# Patient Record
Sex: MALE | Race: WHITE | NOT HISPANIC OR LATINO | Employment: FULL TIME | URBAN - METROPOLITAN AREA
[De-identification: names, ages, dates, MRNs, and addresses within clinical notes are randomized per-mention and may not be internally consistent; named-entity substitution may affect disease eponyms.]

---

## 2017-09-15 DIAGNOSIS — Z23 FLU VACCINE NEED: ICD-10-CM

## 2017-09-21 ENCOUNTER — HOSPITAL ENCOUNTER (OUTPATIENT)
Dept: LAB | Facility: MEDICAL CENTER | Age: 27
End: 2017-09-21
Payer: COMMERCIAL

## 2017-09-21 LAB
BDY FAT % MEASURED: 18.9 %
BP DIAS: 70 MMHG
BP SYS: 124 MMHG
CHOLEST SERPL-MCNC: 131 MG/DL (ref 100–199)
DIABETES HTDIA: NO
EVENT NAME HTEVT: NORMAL
FASTING HTFAS: YES
GLUCOSE SERPL-MCNC: 72 MG/DL (ref 65–99)
HDLC SERPL-MCNC: 39 MG/DL
HYPERTENSION HTHYP: NO
LDLC SERPL CALC-MCNC: 73 MG/DL
SCREENING LOC CITY HTCIT: NORMAL
SCREENING LOC STATE HTSTA: NORMAL
SCREENING LOCATION HTLOC: NORMAL
SMOKING HTSMO: NO
SUBSCRIBER ID HTSID: NORMAL
TRIGL SERPL-MCNC: 94 MG/DL (ref 0–149)

## 2017-09-21 PROCEDURE — 82947 ASSAY GLUCOSE BLOOD QUANT: CPT

## 2017-09-21 PROCEDURE — S5190 WELLNESS ASSESSMENT BY NONPH: HCPCS

## 2017-09-21 PROCEDURE — 36415 COLL VENOUS BLD VENIPUNCTURE: CPT

## 2017-09-21 PROCEDURE — 80061 LIPID PANEL: CPT

## 2017-09-22 PROCEDURE — 90686 IIV4 VACC NO PRSV 0.5 ML IM: CPT | Performed by: NURSE PRACTITIONER

## 2017-09-22 PROCEDURE — 90471 IMMUNIZATION ADMIN: CPT | Performed by: NURSE PRACTITIONER

## 2018-01-03 ENCOUNTER — OFFICE VISIT (OUTPATIENT)
Dept: MEDICAL GROUP | Facility: MEDICAL CENTER | Age: 28
End: 2018-01-03
Payer: COMMERCIAL

## 2018-01-03 VITALS
WEIGHT: 252.4 LBS | HEART RATE: 67 BPM | TEMPERATURE: 98.6 F | RESPIRATION RATE: 16 BRPM | DIASTOLIC BLOOD PRESSURE: 82 MMHG | HEIGHT: 75 IN | BODY MASS INDEX: 31.38 KG/M2 | OXYGEN SATURATION: 96 % | SYSTOLIC BLOOD PRESSURE: 126 MMHG

## 2018-01-03 DIAGNOSIS — R00.0 TACHYCARDIA: ICD-10-CM

## 2018-01-03 DIAGNOSIS — Z00.00 ENCOUNTER FOR MEDICAL EXAMINATION TO ESTABLISH CARE: ICD-10-CM

## 2018-01-03 DIAGNOSIS — R00.2 PALPITATION: ICD-10-CM

## 2018-01-03 PROCEDURE — 99214 OFFICE O/P EST MOD 30 MIN: CPT | Performed by: PHYSICIAN ASSISTANT

## 2018-01-03 ASSESSMENT — PATIENT HEALTH QUESTIONNAIRE - PHQ9: CLINICAL INTERPRETATION OF PHQ2 SCORE: 0

## 2018-01-03 NOTE — PROGRESS NOTES
"Subjective:   CC: Darwin Alcaraz is a 27 y.o. male here today for New onset chest palpitations and tachycardia and to establish care. 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    This is a new problem to discuss. Patient states for the past couple months he has been feeling occasional heart palpitation at rest without any associated symptoms including lightheadedness or dizziness. No chest pain or SOB, no lower leg edema, no change in vision. His fiancé bought him a fit bit which has been according his heart rate. States a couple occasions he has had readings of 180 and 220.        Current medicines (including changes today)  No current outpatient prescriptions on file.     No current facility-administered medications for this visit.           Past medical, surgical, family, and social history are reviewed in Epic chart by me today.   Medications and allergies reviewed in Epic chart by me today.         ROS   No chest pain, no shortness of breath, no abdominal pain  As documented in history of present illness above     Objective:     Blood pressure 126/82, pulse 67, temperature 37 °C (98.6 °F), resp. rate 16, height 1.905 m (6' 3\"), weight 114.5 kg (252 lb 6.4 oz), SpO2 96 %. Body mass index is 31.55 kg/m².   Physical Exam:  Constitutional: Alert, oriented in no acute distress.  Psych: Eye contact is good, speech goal directed, affect calm  Eyes: Conjunctiva non-injected, sclera non-icteric.  ENMT: Ears:Pinna normal. TM pearly gray.               Lips without lesions, Clear oropharynx, mucous membranes pink and moist.  Neck: No cervical or supraclavicular lymphadenopathy,Trachea midline, no thyromegaly, no masses  Lungs: Unlabored respiratory effort, clear to auscultation bilaterally with good excursion, no wheez or rhonci  CV: regular rate and rhythm. No lower extremity edema  Abdomen: soft, nontender, No CVAT  Skin: no lesions in visible areas.  Ext: no edema, color normal, vascularity normal, temperature " normal        Assessment and Plan:   The following treatment plan was discussed    1. Palpitation  Not sure what is causing the symptoms. We will check his thyroid and electrolytes and effect him to cardiology for further evaluation.  - REFERRAL TO CARDIOLOGY  - TSH; Future  - FREE THYROXINE; Future  - BASIC METABOLIC PANEL; Future    2. Tachycardia    - REFERRAL TO CARDIOLOGY    3. Encounter for medical examination to establish care        Followup: Return if symptoms worsen or fail to improve.         Please note that this dictation was created using voice recognition software. I have made every reasonable attempt to correct obvious errors, but I expect that there are errors of grammar and possibly content that I did not discover before finalizing the note.

## 2018-09-24 DIAGNOSIS — Z23 NEED FOR INFLUENZA VACCINATION: ICD-10-CM

## 2018-11-07 ENCOUNTER — NON-PROVIDER VISIT (OUTPATIENT)
Dept: PULMONOLOGY | Facility: HOSPICE | Age: 28
End: 2018-11-07
Payer: COMMERCIAL

## 2018-11-07 PROCEDURE — 90686 IIV4 VACC NO PRSV 0.5 ML IM: CPT | Performed by: NURSE PRACTITIONER

## 2018-11-07 PROCEDURE — 90471 IMMUNIZATION ADMIN: CPT | Performed by: NURSE PRACTITIONER

## 2019-05-22 ENCOUNTER — OFFICE VISIT (OUTPATIENT)
Dept: MEDICAL GROUP | Facility: MEDICAL CENTER | Age: 29
End: 2019-05-22
Payer: COMMERCIAL

## 2019-05-22 VITALS
TEMPERATURE: 97.5 F | HEIGHT: 75 IN | RESPIRATION RATE: 20 BRPM | DIASTOLIC BLOOD PRESSURE: 82 MMHG | BODY MASS INDEX: 32.78 KG/M2 | HEART RATE: 64 BPM | SYSTOLIC BLOOD PRESSURE: 128 MMHG | OXYGEN SATURATION: 95 % | WEIGHT: 263.67 LBS

## 2019-05-22 DIAGNOSIS — Z00.00 PREVENTATIVE HEALTH CARE: ICD-10-CM

## 2019-05-22 DIAGNOSIS — R00.2 PALPITATIONS: ICD-10-CM

## 2019-05-22 DIAGNOSIS — Z23 NEED FOR VACCINATION: ICD-10-CM

## 2019-05-22 PROCEDURE — 99213 OFFICE O/P EST LOW 20 MIN: CPT | Performed by: PHYSICIAN ASSISTANT

## 2019-05-22 ASSESSMENT — PATIENT HEALTH QUESTIONNAIRE - PHQ9: CLINICAL INTERPRETATION OF PHQ2 SCORE: 0

## 2019-05-22 NOTE — PROGRESS NOTES
"Chief Complaint   Patient presents with   • Referral Update Request     cardio       HPI  Darwin Alcaraz is a 29 y.o. male here today for f/u on chest palpitations and tachycardia.  Patient was seen for the same problem by me little over a year ago.  At this time he was asymptomatic and he would get occasional palpitations that could happen at rest or with activity.  He continues to get palpitations at rest or with activity however now he is symptomatic and states when it happens he feels a little shortness of breath or mainly wanting to seat.  Problem lasts for couple minutes until it resolves on its own.  No lightheadedness or dizziness or chest pain associated with it.  Patient did not follow-up with cardiology and did not do blood work.  However this time he has an appointment with cardiology.      Past medical, surgical, family, and social history is reviewed in Epic chart by me today.   Medications and allergies reviewed and updated in Epic chart by me today.     ROS:   As documented in history of present illness above    Exam:  /82 (Patient Position: Sitting)   Pulse 64   Temp 36.4 °C (97.5 °F) (Temporal)   Resp 20   Ht 1.905 m (6' 3\")   Wt 119.6 kg (263 lb 10.7 oz)   SpO2 95%   Constitutional: Alert, no distress, plus 3 vital signs  Skin:  Warm, dry, no rashes invisible areas  Eye: Equal, round and reactive, conjunctiva clear  Respiratory: Unlabored respiratory effort, lungs clear to auscultation, no wheezes, no rhonchi  Cardiovascular: RRR, no murmur, no lower extremities edema,   Psych: Alert, pleasant, well-groomed, normal affect    A/P:  1. Need for vaccination    - TDAP VACCINE =>8YO IM    2. Palpitations  Advised patient to do blood work,   - TSH WITH REFLEX TO FT4; Future  - REFERRAL TO CARDIOLOGY    3. Preventative health care    - CBC WITH DIFFERENTIAL; Future  - Comp Metabolic Panel; Future  - Lipid Profile; Future  - TSH WITH REFLEX TO FT4; Future  - VITAMIN D,25 HYDROXY; Future    F/u " prn

## 2019-05-28 ENCOUNTER — OFFICE VISIT (OUTPATIENT)
Dept: CARDIOLOGY | Facility: MEDICAL CENTER | Age: 29
End: 2019-05-28
Payer: COMMERCIAL

## 2019-05-28 VITALS
OXYGEN SATURATION: 95 % | HEIGHT: 75 IN | DIASTOLIC BLOOD PRESSURE: 68 MMHG | WEIGHT: 265 LBS | BODY MASS INDEX: 32.95 KG/M2 | SYSTOLIC BLOOD PRESSURE: 122 MMHG | HEART RATE: 62 BPM

## 2019-05-28 DIAGNOSIS — I48.0 PAF (PAROXYSMAL ATRIAL FIBRILLATION) (HCC): ICD-10-CM

## 2019-05-28 DIAGNOSIS — R74.8 LOW SERUM HDL: ICD-10-CM

## 2019-05-28 LAB — EKG IMPRESSION: NORMAL

## 2019-05-28 PROCEDURE — 93000 ELECTROCARDIOGRAM COMPLETE: CPT | Performed by: INTERNAL MEDICINE

## 2019-05-28 PROCEDURE — 99244 OFF/OP CNSLTJ NEW/EST MOD 40: CPT | Performed by: INTERNAL MEDICINE

## 2019-05-28 RX ORDER — DILTIAZEM HYDROCHLORIDE 120 MG/1
120 CAPSULE, COATED, EXTENDED RELEASE ORAL DAILY
Qty: 30 CAP | Refills: 11 | Status: SHIPPED | OUTPATIENT
Start: 2019-05-28 | End: 2019-07-10 | Stop reason: SDUPTHER

## 2019-05-28 NOTE — PROGRESS NOTES
Chief Complaint   Patient presents with   • Palpitations     NP       Subjective:   Darwin Alcaraz is a 29 y.o. male who presents today for initial consultation regarding palpitations.  Has no medical history and exercises avidly and routinely focusing on resistance training rather than cardiovascular training although he does both.  He has noticed for several years he has had intermittent palpitations that have gotten worse over the past 6 months or so.  They have lasted up to 20 minutes at a time and had been occurring every day or 2.  He has a history of pre-workout supplementation use and caffeine.  He has discontinued these and it has dramatically reduced the frequency of his palpitations.  He brings in apple watch recording, multiple actually, some of which interestingly show episodic atrial fibrillation.  During those recorded events he notes that he feels quite fatigued which is nonspecific rapidly resolves after his dysrhythmia improves.  He has no stroke risk factors and no family history of stroke.  His EKG is borderline abnormal with possible right atrial abnormality and incomplete right bundle branch block but sinus rhythm today.  He notes no other triggers.  He does not smoke drink excessively or use drugs that are illegal.  He has no family history precocious CAD.    History reviewed. No pertinent past medical history.  Past Surgical History:   Procedure Laterality Date   • OTHER  2007    multiple facial fracture repair on right side with metal on right face     Family History   Problem Relation Age of Onset   • No Known Problems Mother    • No Known Problems Father    • No Known Problems Sister    • No Known Problems Brother    • Hypertension Maternal Grandmother    • Cancer Neg Hx    • Diabetes Neg Hx    • Heart Disease Neg Hx      Social History     Social History   • Marital status: Single     Spouse name: N/A   • Number of children: N/A   • Years of education: N/A     Occupational History   • Not  "on file.     Social History Main Topics   • Smoking status: Never Smoker   • Smokeless tobacco: Never Used   • Alcohol use Yes      Comment: occasionally   • Drug use: Yes     Types: Marijuana      Comment: occ   • Sexual activity: Yes     Partners: Female     Other Topics Concern   • Not on file     Social History Narrative   • No narrative on file     Allergies   Allergen Reactions   • Pcn [Penicillins]      Outpatient Encounter Prescriptions as of 5/28/2019   Medication Sig Dispense Refill   • DILTIAZem CD (CARDIZEM CD) 120 MG CAPSULE SR 24 HR Take 1 Cap by mouth every day. 30 Cap 11     No facility-administered encounter medications on file as of 5/28/2019.      Review of Systems   All other systems reviewed and are negative.       Objective:   /68 (BP Location: Left arm, Patient Position: Sitting, BP Cuff Size: Adult)   Pulse 62   Ht 1.905 m (6' 3\")   Wt 120.2 kg (265 lb)   SpO2 95%   BMI 33.12 kg/m²     Physical Exam   Constitutional: He is oriented to person, place, and time. He appears well-developed and well-nourished. No distress.   Tall and athletic appearing   HENT:   Head: Normocephalic and atraumatic.   Right Ear: External ear normal.   Left Ear: External ear normal.   Eyes: Pupils are equal, round, and reactive to light. Conjunctivae and EOM are normal. Right eye exhibits no discharge. Left eye exhibits no discharge. No scleral icterus.   Neck: Normal range of motion. Neck supple. No JVD present. No tracheal deviation present. No thyromegaly present.   Cardiovascular: Normal rate, regular rhythm and intact distal pulses.  PMI is not displaced.  Exam reveals no gallop and no friction rub.    No murmur heard.  Pulses:       Carotid pulses are 2+ on the right side, and 2+ on the left side.       Radial pulses are 2+ on the left side.        Popliteal pulses are 2+ on the right side, and 2+ on the left side.        Dorsalis pedis pulses are 2+ on the right side, and 2+ on the left side.        " Posterior tibial pulses are 2+ on the right side, and 2+ on the left side.   Pulmonary/Chest: Effort normal and breath sounds normal. No respiratory distress. He has no wheezes. He has no rales. He exhibits no tenderness.   Abdominal: Soft. Bowel sounds are normal. He exhibits no distension. There is no tenderness.   Musculoskeletal: Normal range of motion. He exhibits no edema, tenderness or deformity.   Neurological: He is alert and oriented to person, place, and time. No cranial nerve deficit (cranial nerves II through XII grossly intact). Coordination normal.   Skin: Skin is warm and dry. No rash noted. He is not diaphoretic. No erythema. No pallor.   Psychiatric: He has a normal mood and affect. His behavior is normal. Thought content normal.   Vitals reviewed.    LABS:  Lab Results   Component Value Date/Time    CHOLSTRLTOT 131 09/21/2017 11:57 AM    LDL 73 09/21/2017 11:57 AM    HDL 39 (A) 09/21/2017 11:57 AM    TRIGLYCERIDE 94 09/21/2017 11:57 AM       EKG (5/28/2019):  I have personally reviewed the EKG this visit and discussed with the patient.  Sinus rhythm, borderline right atrial abnormality and incomplete right bundle branch block    Apple watch recordings reviewed with the patient in detail today demonstrating paroxysmal atrial for ablation with rapid ventricular response.      Assessment:     1. PAF (paroxysmal atrial fibrillation) (HCC)     2. Low serum HDL         Medical Decision Making:  Today's Assessment / Status / Plan:     His apple watch recordings do seem to demonstrate paroxysms of atrial fibrillation and his clinical symptoms are most compatible with both PACs/SVT and intermittent atrial fibrillation.  He has a [id number] (CHF/CM, HTN, Age, DM, CVA, Vascular disease, Gender) = 0 and this would be lone atrial fibrillation as long as his echocardiogram is normal.  Triggered and exacerbated by caffeine and stimulant use.  Given his borderline right atrial abnormality and incomplete right  bundle branch block despite being an otherwise young healthy person I would recommend an echocardiogram and a baby aspirin.  We discussed suppressive medications such as calcium channel blockers Cardizem 120 mg daily would be recommended as tolerated.  We also discussed upfront ablation.  I would also like to confirm the arrhythmia with a more formal monitor such as ZIO Patch.  He will follow-up after testing.    Thank you for this interesting consultation. It was my pleasure to see Darwin Alcaraz today.    Urbano Banks MD, FACC, [facility]  Division of Interventional Cardiology  [facility]

## 2019-06-13 ENCOUNTER — HOSPITAL ENCOUNTER (OUTPATIENT)
Dept: CARDIOLOGY | Facility: MEDICAL CENTER | Age: 29
End: 2019-06-13
Attending: INTERNAL MEDICINE
Payer: COMMERCIAL

## 2019-06-13 PROCEDURE — 93306 TTE W/DOPPLER COMPLETE: CPT | Mod: 26 | Performed by: INTERNAL MEDICINE

## 2019-06-13 PROCEDURE — 93306 TTE W/DOPPLER COMPLETE: CPT

## 2019-06-14 LAB
LV EJECT FRACT  99904: 65
LV EJECT FRACT MOD 2C 99903: 68.29
LV EJECT FRACT MOD 4C 99902: 58.98
LV EJECT FRACT MOD BP 99901: 63.82

## 2019-06-17 ENCOUNTER — HOSPITAL ENCOUNTER (OUTPATIENT)
Dept: LAB | Facility: MEDICAL CENTER | Age: 29
End: 2019-06-17
Attending: PHYSICIAN ASSISTANT
Payer: COMMERCIAL

## 2019-06-17 DIAGNOSIS — R00.2 PALPITATIONS: ICD-10-CM

## 2019-06-17 DIAGNOSIS — Z00.00 PREVENTATIVE HEALTH CARE: ICD-10-CM

## 2019-06-17 LAB
25(OH)D3 SERPL-MCNC: 20 NG/ML (ref 30–100)
ALBUMIN SERPL BCP-MCNC: 4.7 G/DL (ref 3.2–4.9)
ALBUMIN/GLOB SERPL: 1.9 G/DL
ALP SERPL-CCNC: 47 U/L (ref 30–99)
ALT SERPL-CCNC: 23 U/L (ref 2–50)
ANION GAP SERPL CALC-SCNC: 8 MMOL/L (ref 0–11.9)
AST SERPL-CCNC: 26 U/L (ref 12–45)
BASOPHILS # BLD AUTO: 0.8 % (ref 0–1.8)
BASOPHILS # BLD: 0.04 K/UL (ref 0–0.12)
BILIRUB SERPL-MCNC: 1.5 MG/DL (ref 0.1–1.5)
BUN SERPL-MCNC: 20 MG/DL (ref 8–22)
CALCIUM SERPL-MCNC: 9.9 MG/DL (ref 8.5–10.5)
CHLORIDE SERPL-SCNC: 106 MMOL/L (ref 96–112)
CHOLEST SERPL-MCNC: 127 MG/DL (ref 100–199)
CO2 SERPL-SCNC: 27 MMOL/L (ref 20–33)
CREAT SERPL-MCNC: 1.34 MG/DL (ref 0.5–1.4)
EOSINOPHIL # BLD AUTO: 0.19 K/UL (ref 0–0.51)
EOSINOPHIL NFR BLD: 3.8 % (ref 0–6.9)
ERYTHROCYTE [DISTWIDTH] IN BLOOD BY AUTOMATED COUNT: 38.8 FL (ref 35.9–50)
FASTING STATUS PATIENT QL REPORTED: NORMAL
GLOBULIN SER CALC-MCNC: 2.5 G/DL (ref 1.9–3.5)
GLUCOSE SERPL-MCNC: 102 MG/DL (ref 65–99)
HCT VFR BLD AUTO: 50.9 % (ref 42–52)
HDLC SERPL-MCNC: 36 MG/DL
HGB BLD-MCNC: 17.3 G/DL (ref 14–18)
IMM GRANULOCYTES # BLD AUTO: 0.02 K/UL (ref 0–0.11)
IMM GRANULOCYTES NFR BLD AUTO: 0.4 % (ref 0–0.9)
LDLC SERPL CALC-MCNC: 68 MG/DL
LYMPHOCYTES # BLD AUTO: 2.6 K/UL (ref 1–4.8)
LYMPHOCYTES NFR BLD: 51.5 % (ref 22–41)
MCH RBC QN AUTO: 30 PG (ref 27–33)
MCHC RBC AUTO-ENTMCNC: 34 G/DL (ref 33.7–35.3)
MCV RBC AUTO: 88.4 FL (ref 81.4–97.8)
MONOCYTES # BLD AUTO: 0.31 K/UL (ref 0–0.85)
MONOCYTES NFR BLD AUTO: 6.1 % (ref 0–13.4)
NEUTROPHILS # BLD AUTO: 1.89 K/UL (ref 1.82–7.42)
NEUTROPHILS NFR BLD: 37.4 % (ref 44–72)
NRBC # BLD AUTO: 0 K/UL
NRBC BLD-RTO: 0 /100 WBC
PLATELET # BLD AUTO: 194 K/UL (ref 164–446)
PMV BLD AUTO: 11.1 FL (ref 9–12.9)
POTASSIUM SERPL-SCNC: 4.1 MMOL/L (ref 3.6–5.5)
PROT SERPL-MCNC: 7.2 G/DL (ref 6–8.2)
RBC # BLD AUTO: 5.76 M/UL (ref 4.7–6.1)
SODIUM SERPL-SCNC: 141 MMOL/L (ref 135–145)
TRIGL SERPL-MCNC: 115 MG/DL (ref 0–149)
TSH SERPL DL<=0.005 MIU/L-ACNC: 4.16 UIU/ML (ref 0.38–5.33)
WBC # BLD AUTO: 5.1 K/UL (ref 4.8–10.8)

## 2019-06-17 PROCEDURE — 85025 COMPLETE CBC W/AUTO DIFF WBC: CPT

## 2019-06-17 PROCEDURE — 84443 ASSAY THYROID STIM HORMONE: CPT

## 2019-06-17 PROCEDURE — 36415 COLL VENOUS BLD VENIPUNCTURE: CPT

## 2019-06-17 PROCEDURE — 82306 VITAMIN D 25 HYDROXY: CPT

## 2019-06-17 PROCEDURE — 80061 LIPID PANEL: CPT

## 2019-06-17 PROCEDURE — 80053 COMPREHEN METABOLIC PANEL: CPT

## 2019-06-19 ENCOUNTER — TELEPHONE (OUTPATIENT)
Dept: CARDIOLOGY | Facility: MEDICAL CENTER | Age: 29
End: 2019-06-19

## 2019-06-19 ENCOUNTER — NON-PROVIDER VISIT (OUTPATIENT)
Dept: CARDIOLOGY | Facility: MEDICAL CENTER | Age: 29
End: 2019-06-19
Payer: COMMERCIAL

## 2019-06-19 DIAGNOSIS — I48.0 PAROXYSMAL ATRIAL FIBRILLATION (HCC): ICD-10-CM

## 2019-06-28 ENCOUNTER — TELEPHONE (OUTPATIENT)
Dept: CARDIOLOGY | Facility: MEDICAL CENTER | Age: 29
End: 2019-06-28

## 2019-06-28 PROCEDURE — 0298T PR EXT ECG > 48HR TO 21 DAY REVIEW AND INTERPRETATN: CPT | Performed by: INTERNAL MEDICINE

## 2019-06-28 PROCEDURE — 0296T PR EXT ECG > 48HR TO 21 DAY RCRD W/CONECT INTL RCRD: CPT | Performed by: INTERNAL MEDICINE

## 2019-06-28 NOTE — TELEPHONE ENCOUNTER
[facility] reporting abnormal EKG   Received: Today   Message Contents   PRISCILA Herrera/Melchor Mazariegos at [facility] is calling to report an abnormal EKG. Ph. #[phone].      =======================  Called [facility] who states pt had afib with rvr consistently with HR up to 243 briefly. They will be posting reuslts. Will forward to MD once received.

## 2019-06-28 NOTE — TELEPHONE ENCOUNTER
Per albin text from Dr. Banks pt needs an appointment with a NP next week or the week after. Called and notified pt. Transferred to be scheduled.

## 2019-07-05 ENCOUNTER — TELEPHONE (OUTPATIENT)
Dept: CARDIOLOGY | Facility: MEDICAL CENTER | Age: 29
End: 2019-07-05

## 2019-07-10 ENCOUNTER — OFFICE VISIT (OUTPATIENT)
Dept: CARDIOLOGY | Facility: MEDICAL CENTER | Age: 29
End: 2019-07-10
Payer: COMMERCIAL

## 2019-07-10 VITALS
SYSTOLIC BLOOD PRESSURE: 128 MMHG | OXYGEN SATURATION: 95 % | HEART RATE: 72 BPM | BODY MASS INDEX: 30.46 KG/M2 | DIASTOLIC BLOOD PRESSURE: 84 MMHG | WEIGHT: 245 LBS | HEIGHT: 75 IN

## 2019-07-10 DIAGNOSIS — I48.0 PAF (PAROXYSMAL ATRIAL FIBRILLATION) (HCC): Primary | ICD-10-CM

## 2019-07-10 PROCEDURE — 99214 OFFICE O/P EST MOD 30 MIN: CPT | Performed by: NURSE PRACTITIONER

## 2019-07-10 RX ORDER — CETIRIZINE HYDROCHLORIDE 10 MG/1
10 TABLET ORAL DAILY
COMMUNITY

## 2019-07-10 RX ORDER — DILTIAZEM HYDROCHLORIDE 120 MG/1
120 CAPSULE, COATED, EXTENDED RELEASE ORAL DAILY
Qty: 90 CAP | Refills: 3 | Status: SHIPPED | OUTPATIENT
Start: 2019-07-10 | End: 2021-03-02

## 2019-07-10 ASSESSMENT — ENCOUNTER SYMPTOMS
DIZZINESS: 0
SHORTNESS OF BREATH: 1
WEAKNESS: 0
CLAUDICATION: 0
ORTHOPNEA: 0
PALPITATIONS: 1
COUGH: 0
ABDOMINAL PAIN: 0
PND: 0
MYALGIAS: 0

## 2019-07-10 NOTE — PROGRESS NOTES
"Chief Complaint   Patient presents with   • Palpitations     follow up       Subjective:   Darwin \"Chance\" Kris Alcaraz is a 29 y.o. male who presents today on paroxysmal atrial fibrillation.  He has noted atrial fibrillation on his apple watch and was seen by Dr Banks on June 27, 2019.  Zio Patch was ordered in order to determine how much atrial fibrillation he has been having.    He complains of a rapid heart rate feels short of breath and lightheaded.  This can occur with caffeine use, alcohol or exercise.  He has reduced his exercise which is usually weight lifting.  He tells me he has tested himself more than once for sleep apnea and that his AHI is 4.  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    Otherwise he feels well and denies any complaints.    Past Medical History:   Diagnosis Date   • Paroxysmal atrial fibrillation (HCC)      Past Surgical History:   Procedure Laterality Date   • OTHER  2007    multiple facial fracture repair on right side with metal on right face     Family History   Problem Relation Age of Onset   • No Known Problems Mother    • No Known Problems Father    • No Known Problems Sister    • No Known Problems Brother    • Hypertension Maternal Grandmother    • Cancer Neg Hx    • Diabetes Neg Hx    • Heart Disease Neg Hx      Social History     Social History   • Marital status: Single     Spouse name: N/A   • Number of children: N/A   • Years of education: N/A     Occupational History   • Not on file.     Social History Main Topics   • Smoking status: Never Smoker   • Smokeless tobacco: Never Used   • Alcohol use Yes      Comment: occasionally   • Drug use: Unknown      Comment: occ   • Sexual activity: Yes     Partners: Female     Other Topics Concern   • Not on file     Social History Narrative   • No narrative on file     Allergies   Allergen Reactions   • Pcn [Penicillins]      Outpatient Encounter Prescriptions as of 7/10/2019   Medication Sig Dispense Refill   • aspirin EC " "(ECOTRIN) 81 MG Tablet Delayed Response Take 81 mg by mouth every day.     • Multiple Vitamin (MULTI-VITAMIN DAILY PO) Take  by mouth.     • cetirizine (ZYRTEC) 10 MG Tab Take 10 mg by mouth every day.     • DILTIAZem CD (CARDIZEM CD) 120 MG CAPSULE SR 24 HR Take 1 Cap by mouth every day. 90 Cap 3   • [DISCONTINUED] DILTIAZem CD (CARDIZEM CD) 120 MG CAPSULE SR 24 HR Take 1 Cap by mouth every day. (Patient not taking: Reported on 7/10/2019) 30 Cap 11     No facility-administered encounter medications on file as of 7/10/2019.      Review of Systems   Constitutional: Negative for malaise/fatigue.   Respiratory: Positive for shortness of breath (when in rapid a fib.). Negative for cough.    Cardiovascular: Positive for palpitations. Negative for chest pain, orthopnea, claudication, leg swelling and PND.   Gastrointestinal: Negative for abdominal pain.   Musculoskeletal: Negative for myalgias.   Neurological: Negative for dizziness and weakness.        Objective:   /84 (BP Location: Left arm, Patient Position: Sitting)   Pulse 72   Ht 1.905 m (6' 3\")   Wt 111.1 kg (245 lb)   SpO2 95%   BMI 30.62 kg/m²     Physical Exam   Constitutional: He is oriented to person, place, and time. He appears well-developed and well-nourished.   Muscular healthy-appearing male in no acute distress.   HENT:   Head: Normocephalic.   Eyes: EOM are normal.   Neck: Normal range of motion. No JVD present.   Cardiovascular: Normal rate and regular rhythm.  Exam reveals no friction rub.    No murmur heard.  Pulmonary/Chest: Effort normal.   Abdominal: Soft. Bowel sounds are normal.   Musculoskeletal: He exhibits no edema.   Neurological: He is alert and oriented to person, place, and time.   Skin: Skin is warm and dry.   Psychiatric: He has a normal mood and affect.     Results for TRISTON FELICIANO (MRN [id number])    Ref. Range 6/17/2019 15:32   WBC Latest Ref Range: 4.8 - 10.8 K/uL 5.1   RBC Latest Ref Range: 4.70 - 6.10 M/uL 5.76 "   Hemoglobin Latest Ref Range: 14.0 - 18.0 g/dL 17.3   Hematocrit Latest Ref Range: 42.0 - 52.0 % 50.9   MCV Latest Ref Range: 81.4 - 97.8 fL 88.4   MCH Latest Ref Range: 27.0 - 33.0 pg 30.0   MCHC Latest Ref Range: 33.7 - 35.3 g/dL 34.0   RDW Latest Ref Range: 35.9 - 50.0 fL 38.8   Platelet Count Latest Ref Range: 164 - 446 K/uL 194   MPV Latest Ref Range: 9.0 - 12.9 fL 11.1   Neutrophils-Polys Latest Ref Range: 44.00 - 72.00 % 37.40 (L)   Neutrophils (Absolute) Latest Ref Range: 1.82 - 7.42 K/uL 1.89   Lymphocytes Latest Ref Range: 22.00 - 41.00 % 51.50 (H)   Lymphs (Absolute) Latest Ref Range: 1.00 - 4.80 K/uL 2.60   Monocytes Latest Ref Range: 0.00 - 13.40 % 6.10   Monos (Absolute) Latest Ref Range: 0.00 - 0.85 K/uL 0.31   Eosinophils Latest Ref Range: 0.00 - 6.90 % 3.80   Eos (Absolute) Latest Ref Range: 0.00 - 0.51 K/uL 0.19   Basophils Latest Ref Range: 0.00 - 1.80 % 0.80   Baso (Absolute) Latest Ref Range: 0.00 - 0.12 K/uL 0.04   Immature Granulocytes Latest Ref Range: 0.00 - 0.90 % 0.40   Immature Granulocytes (abs) Latest Ref Range: 0.00 - 0.11 K/uL 0.02   Nucleated RBC Latest Units: /100 WBC 0.00   NRBC (Absolute) Latest Units: K/uL 0.00   Sodium Latest Ref Range: 135 - 145 mmol/L 141   Potassium Latest Ref Range: 3.6 - 5.5 mmol/L 4.1   Chloride Latest Ref Range: 96 - 112 mmol/L 106   Co2 Latest Ref Range: 20 - 33 mmol/L 27   Anion Gap Latest Ref Range: 0.0 - 11.9  8.0   Glucose Latest Ref Range: 65 - 99 mg/dL 102 (H)   Bun Latest Ref Range: 8 - 22 mg/dL 20   Creatinine Latest Ref Range: 0.50 - 1.40 mg/dL 1.34   GFR If  Latest Ref Range: >60 mL/min/1.73 m 2 >60   GFR If Non  Latest Ref Range: >60 mL/min/1.73 m 2 >60   Calcium Latest Ref Range: 8.5 - 10.5 mg/dL 9.9   AST(SGOT) Latest Ref Range: 12 - 45 U/L 26   ALT(SGPT) Latest Ref Range: 2 - 50 U/L 23   Alkaline Phosphatase Latest Ref Range: 30 - 99 U/L 47   Total Bilirubin Latest Ref Range: 0.1 - 1.5 mg/dL 1.5   Albumin  Latest Ref Range: 3.2 - 4.9 g/dL 4.7   Total Protein Latest Ref Range: 6.0 - 8.2 g/dL 7.2   Globulin Latest Ref Range: 1.9 - 3.5 g/dL 2.5   A-G Ratio Latest Units: g/dL 1.9   Fasting Status Unknown Fasting   Cholesterol,Tot Latest Ref Range: 100 - 199 mg/dL 127   Triglycerides Latest Ref Range: 0 - 149 mg/dL 115   HDL Latest Ref Range: >=40 mg/dL 36 (A)   LDL Latest Ref Range: <100 mg/dL 68   25-Hydroxy   Vitamin D 25 Latest Ref Range: 30 - 100 ng/mL 20 (L)   TSH Latest Ref Range: 0.380 - 5.330 uIU/mL 4.160     June 13, 2018: Transthoracic Echo Report  Echocardiography Laboratory  CONCLUSIONS  No prior study is available for comparison.   Normal transthoracic echocardiogram.     June 19-24, 2019: Zio Patch shows episodes of rapid atrial fibrillation.  He did not start diltiazem prior to wearing the Zio Patch.    Assessment:     1. PAF (paroxysmal atrial fibrillation) (HCC)  DILTIAZem CD (CARDIZEM CD) 120 MG CAPSULE SR 24 HR    REFERRAL TO CARDIAC ELECTROPHYSIOLOGY       Medical Decision Making:  Today's Assessment / Status / Plan:   Paroxysmal atrial fibrillation: He is having rapid atrial fibrillation especially during exercise.  I am concerned about tachycardia induced cardiomyopathy.  He is willing to take the diltiazem for rate and rhythm control.  He will continue with aspirin 81 mg for anticoagulation his chads vas score is 0.    I will refer him to electrophysiology for consideration of ablation.    He will follow-up as scheduled withDr Banks on September 6, 2019.  He will follow-up sooner if problems.    Collaborating Provider: Dr Currie.    Please note that this dictation was created using voice recognition software. I have made every reasonable attempt to correct obvious errors, but it is possible there are errors of grammar and possibly content that I did not discover before finalizing the note.

## 2019-07-15 ENCOUNTER — TELEPHONE (OUTPATIENT)
Dept: CARDIOLOGY | Facility: MEDICAL CENTER | Age: 29
End: 2019-07-15

## 2019-08-05 ENCOUNTER — TELEPHONE (OUTPATIENT)
Dept: CARDIOLOGY | Facility: MEDICAL CENTER | Age: 29
End: 2019-08-05

## 2019-08-07 ENCOUNTER — OFFICE VISIT (OUTPATIENT)
Dept: CARDIOLOGY | Facility: MEDICAL CENTER | Age: 29
End: 2019-08-07
Payer: COMMERCIAL

## 2019-08-07 VITALS
OXYGEN SATURATION: 96 % | HEIGHT: 75 IN | HEART RATE: 114 BPM | DIASTOLIC BLOOD PRESSURE: 88 MMHG | WEIGHT: 255 LBS | BODY MASS INDEX: 31.71 KG/M2 | SYSTOLIC BLOOD PRESSURE: 120 MMHG

## 2019-08-07 DIAGNOSIS — Z79.01 CHRONIC ANTICOAGULATION: ICD-10-CM

## 2019-08-07 DIAGNOSIS — I48.0 PAF (PAROXYSMAL ATRIAL FIBRILLATION) (HCC): Primary | ICD-10-CM

## 2019-08-07 DIAGNOSIS — Z79.899 HIGH RISK MEDICATION USE: ICD-10-CM

## 2019-08-07 PROCEDURE — 93000 ELECTROCARDIOGRAM COMPLETE: CPT | Performed by: INTERNAL MEDICINE

## 2019-08-07 PROCEDURE — 99214 OFFICE O/P EST MOD 30 MIN: CPT | Performed by: NURSE PRACTITIONER

## 2019-08-07 RX ORDER — FLECAINIDE ACETATE 50 MG/1
50 TABLET ORAL 2 TIMES DAILY
Qty: 60 TAB | Refills: 3 | Status: ON HOLD | OUTPATIENT
Start: 2019-08-07 | End: 2019-09-24

## 2019-08-07 ASSESSMENT — ENCOUNTER SYMPTOMS
FEVER: 0
PALPITATIONS: 1
COUGH: 0
CHILLS: 0
LOSS OF CONSCIOUSNESS: 0
ORTHOPNEA: 0
ABDOMINAL PAIN: 0
CLAUDICATION: 0
DIZZINESS: 0
DIAPHORESIS: 0
SHORTNESS OF BREATH: 1
PND: 0
BLOOD IN STOOL: 0
WHEEZING: 0

## 2019-08-07 NOTE — PROGRESS NOTES
Cardiology/Electrophysiology Follow-up Note    Subjective:   Chief Complaint:   Chief Complaint   Patient presents with   • Atrial Fibrillation     PP     Darwin Alcaraz is a 29 y.o. male who presents today for Paroxysmal Atrial Fibrillation on Diltiazem.    He is followed by Dr. Brown and was last seen by Julia JANE on 07/10/19 for PAF.  His zio monitor showed 28% AF burden with Hrs  bpm. He was started on Diltiazem and referral to Electrophysiology for further evaluation.      Medical history significant for Paroxysmal Atrial Fibrillation.     Today he states he is feeling well.  He reports intermittent palpitations and dyspnea with elevated heart rates, otherwise relatively asymptomatic.  He denies chest pain, dizziness, pre syncope or syncope, dyspnea, PND, orthopnea, or lower extremity edema.  Denies any signs or symptoms of bleeding or bleeding issues. Patient endorses medication compliance. 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     Past Medical History:   Diagnosis Date   • Paroxysmal atrial fibrillation (HCC)      Past Surgical History:   Procedure Laterality Date   • OTHER  2007    multiple facial fracture repair on right side with metal on right face     Family History   Problem Relation Age of Onset   • No Known Problems Mother    • No Known Problems Father    • No Known Problems Sister    • No Known Problems Brother    • Hypertension Maternal Grandmother    • Cancer Neg Hx    • Diabetes Neg Hx    • Heart Disease Neg Hx      Social History     Socioeconomic History   • Marital status: Single     Spouse name: Not on file   • Number of children: Not on file   • Years of education: Not on file   • Highest education level: Not on file   Occupational History   • Not on file   Social Needs   • Financial resource strain: Not on file   • Food insecurity:     Worry: Not on file     Inability: Not on file   • Transportation needs:     Medical: Not on  file     Non-medical: Not on file   Tobacco Use   • Smoking status: Never Smoker   • Smokeless tobacco: Never Used   Substance and Sexual Activity   • Alcohol use: Yes     Comment: occasionally   • Drug use: Not on file     Comment: occ   • Sexual activity: Yes     Partners: Female   Lifestyle   • Physical activity:     Days per week: Not on file     Minutes per session: Not on file   • Stress: Not on file   Relationships   • Social connections:     Talks on phone: Not on file     Gets together: Not on file     Attends Taoism service: Not on file     Active member of club or organization: Not on file     Attends meetings of clubs or organizations: Not on file     Relationship status: Not on file   • Intimate partner violence:     Fear of current or ex partner: Not on file     Emotionally abused: Not on file     Physically abused: Not on file     Forced sexual activity: Not on file   Other Topics Concern   • Not on file   Social History Narrative   • Not on file     Allergies   Allergen Reactions   • Pcn [Penicillins]        Current Outpatient Medications   Medication Sig Dispense Refill   • flecainide (TAMBOCOR) 50 MG tablet Take 1 Tab by mouth 2 times a day. 60 Tab 3   • apixaban (ELIQUIS) 5mg Tab Take 1 Tab by mouth 2 Times a Day. 60 Tab 11   • Multiple Vitamin (MULTI-VITAMIN DAILY PO) Take  by mouth.     • cetirizine (ZYRTEC) 10 MG Tab Take 10 mg by mouth every day.     • DILTIAZem CD (CARDIZEM CD) 120 MG CAPSULE SR 24 HR Take 1 Cap by mouth every day. 90 Cap 3     No current facility-administered medications for this visit.      Review of Systems   Constitutional: Negative for chills, diaphoresis and fever.   Respiratory: Positive for shortness of breath (with elevated HRs). Negative for cough and wheezing.    Cardiovascular: Positive for palpitations. Negative for chest pain, orthopnea, claudication, leg swelling and PND.   Gastrointestinal: Negative for abdominal pain and blood in stool.   Genitourinary:  "Negative for hematuria.   Neurological: Negative for dizziness and loss of consciousness.     All others systems reviewed and negative.   Objective:     /88 (BP Location: Left arm, Patient Position: Sitting, BP Cuff Size: Adult)   Pulse (!) 114   Ht 1.905 m (6' 3\")   Wt 115.7 kg (255 lb)   SpO2 96%  Body mass index is 31.87 kg/m².    Physical Exam   Constitutional: He is oriented to person, place, and time. No distress.   HENT:   Head: Normocephalic.   Eyes: Pupils are equal, round, and reactive to light.   Neck: Normal range of motion. No JVD present.   Cardiovascular: Normal rate. An irregularly irregular rhythm present.   No murmur heard.  Pulses:       Radial pulses are 2+ on the right side, and 2+ on the left side.        Dorsalis pedis pulses are 2+ on the right side, and 2+ on the left side.   Pulmonary/Chest: Effort normal and breath sounds normal. No respiratory distress. He has no wheezes. He has no rales. He exhibits no tenderness.   Abdominal: Soft. Bowel sounds are normal.   Musculoskeletal: He exhibits no edema.   Neurological: He is alert and oriented to person, place, and time.   Skin: Skin is warm and dry. He is not diaphoretic. No erythema.   Psychiatric: Mood, memory, affect and judgment normal.   Nursing note and vitals reviewed.    Cardiac Imaging and Procedures Review:    EKG 08/07/2019: Atrial Fibrillation    Echocardiogram (06/13/2019):   No prior study is available for comparison.   Normal transthoracic echocardiogram. LVEF 65%.  Left Ventricle  Normal left ventricular size, wall thickness, and systolic function.   Left ventricular ejection fraction is visually estimated to be 65%.   Normal regional wall motion. Normal diastolic function.    Labs (personally reviewed and notable for):   Lab Results   Component Value Date/Time    SODIUM 141 06/17/2019 03:32 PM    POTASSIUM 4.1 06/17/2019 03:32 PM    CHLORIDE 106 06/17/2019 03:32 PM    CO2 27 06/17/2019 03:32 PM    GLUCOSE 102 (H) " 06/17/2019 03:32 PM    BUN 20 06/17/2019 03:32 PM    CREATININE 1.34 06/17/2019 03:32 PM      Lab Results   Component Value Date/Time    WBC 5.1 06/17/2019 03:32 PM    RBC 5.76 06/17/2019 03:32 PM    HEMOGLOBIN 17.3 06/17/2019 03:32 PM    HEMATOCRIT 50.9 06/17/2019 03:32 PM    MCV 88.4 06/17/2019 03:32 PM    MCH 30.0 06/17/2019 03:32 PM    MCHC 34.0 06/17/2019 03:32 PM    MPV 11.1 06/17/2019 03:32 PM    NEUTSPOLYS 37.40 (L) 06/17/2019 03:32 PM    LYMPHOCYTES 51.50 (H) 06/17/2019 03:32 PM    MONOCYTES 6.10 06/17/2019 03:32 PM    EOSINOPHILS 3.80 06/17/2019 03:32 PM    BASOPHILS 0.80 06/17/2019 03:32 PM      PT/INR: No results found for: PROTHROMBTM, INR]  Assessment:     1. PAF (paroxysmal atrial fibrillation) ([facility])  EKG    CL-EP ABLATION ATRIAL FIBRILLATION    Comp Metabolic Panel   2. High risk medication use      Flecainide   3. Chronic anticoagulation       Medical Decision Making:  Today's Assessment / Status / Plan:   1. Paroxysmal Atrial Fibrillation  - Relatively asymptomatic, reports intermittent palpitations and dyspnea with elevated HRs   - Zio monitor (06/2019) shows 28% AF burden, occasional PSVT, Hrs  bpm. Dr. Bruno to review.   - Per echo (06/13/19) shows normal LV function, EF 65%. No significant valvular abnormalities.  - EKG today shows Atrial Fibrillation.   - Recent labs reviewed and are relatively unremarkable. TSH normal.   - Previously tested for sleep apnea, AHI 4.   - Discussed PAF treatment options including risks/benefits for medication therapy (rate vs rhythm) vs ablation vs combination.  Patient wishes to proceed with AF ablation.   - Discussed with Dr. Bruno, will discontinue ASA, start OAC and Flecainide 50 mg BID.  Schedule for AF ablation. Order placed.  to schedule.   - [id number] score: 0, stop ASA, start OAC with Eliquis 5 mg BID.  - On diltiazem 120 mg daily, continue.   - Will check CMP in 1 month.   - The risks, benefits,and alternatives to atrial  fibrillation ablation with general anesthesia were discussed in great detail. Specific risks mentioned including bleeding, infection, arteriovenous fistula/pseudoaneurysm related to sheath placement, cardiac perforation with possible tamponade requiring pericardiocentesis or possible open heart surgery were discussed. In addition,we discussed atrial fibrillation ablation specific complications including pulmonary vein stenosis, left atrial perforation (2-4%), and nerve damage involving the recurrent laryngeal nerve, the vagus nerve with resulting gastroparesis, and the phrenic nerve.  Also mentioned was a 1/400 (0.25%) occurrence of atrial esophageal fistula, which is an abnormal communication between the esophagus and the left atrium; this complication is often fatal. Lastly the risks of death, myocardial infarction, stroke, DVT and PE were discussed. The patient verbalized understanding of these potential complications and wishes to proceed with this procedure.    Plan reviewed in detail with the patient and he verbalizes understanding and is in agreement. RTC 9 weeks, after ablation, sooner if clinical condition changes.     Thank you for allowing me to participate in this patients care.  Please contact me with any questions or concerns.     AVINASH Villeda.   [facility]  [phone]    Collaborating MD/ADD: Dr. Leopoldo MD.

## 2019-08-07 NOTE — LETTER
[facility]   [address], [address]  [place] [address]  Phone: [phone]  Fax: [phone]              Darwin Alcaraz  1990    Encounter Date: 8/7/2019    JACEK Villeda          PROGRESS NOTE:  Cardiology/Electrophysiology Follow-up Note    Subjective:   Chief Complaint:   Chief Complaint   Patient presents with   • Atrial Fibrillation     PP     Darwin Alcaraz is a 29 y.o. male who presents today for ***    *** is followed by  *** and  ***.  Last seen by  *** on ***.     Medical history significant for ***    Today in follow up, ***.  *** denies chest pain, dizziness, palpitations, pre syncope or syncope, dyspnea, PND, orthopnea, or lower extremity edema.  Denies and signs or symptoms of bleeding or bleeding issues.     Patient endorses medication compliance.     Past Medical History:   Diagnosis Date   • Paroxysmal atrial fibrillation (HCC)      Past Surgical History:   Procedure Laterality Date   • OTHER  2007    multiple facial fracture repair on right side with metal on right face     Family History   Problem Relation Age of Onset   • No Known Problems Mother    • No Known Problems Father    • No Known Problems Sister    • No Known Problems Brother    • Hypertension Maternal Grandmother    • Cancer Neg Hx    • Diabetes Neg Hx    • Heart Disease Neg Hx      Social History     Socioeconomic History   • Marital status: Single     Spouse name: Not on file   • Number of children: Not on file   • Years of education: Not on file   • Highest education level: Not on file   Occupational History   • Not on file   Social Needs   • Financial resource strain: Not on file   • Food insecurity:     Worry: Not on file     Inability: Not on file   • Transportation needs:     Medical: Not on file     Non-medical: Not on file   Tobacco Use   • Smoking status: Never Smoker   • Smokeless tobacco: Never Used   Substance and Sexual Activity    "  • Alcohol use: Yes     Comment: occasionally   • Drug use: Not on file     Comment: occ   • Sexual activity: Yes     Partners: Female   Lifestyle   • Physical activity:     Days per week: Not on file     Minutes per session: Not on file   • Stress: Not on file   Relationships   • Social connections:     Talks on phone: Not on file     Gets together: Not on file     Attends Sikh service: Not on file     Active member of club or organization: Not on file     Attends meetings of clubs or organizations: Not on file     Relationship status: Not on file   • Intimate partner violence:     Fear of current or ex partner: Not on file     Emotionally abused: Not on file     Physically abused: Not on file     Forced sexual activity: Not on file   Other Topics Concern   • Not on file   Social History Narrative   • Not on file     Allergies   Allergen Reactions   • Pcn [Penicillins]        Current Outpatient Medications   Medication Sig Dispense Refill   • aspirin EC (ECOTRIN) 81 MG Tablet Delayed Response Take 81 mg by mouth every day.     • Multiple Vitamin (MULTI-VITAMIN DAILY PO) Take  by mouth.     • cetirizine (ZYRTEC) 10 MG Tab Take 10 mg by mouth every day.     • DILTIAZem CD (CARDIZEM CD) 120 MG CAPSULE SR 24 HR Take 1 Cap by mouth every day. 90 Cap 3     No current facility-administered medications for this visit.        ROS  All others systems reviewed and negative.   Objective:     /88 (BP Location: Left arm, Patient Position: Sitting, BP Cuff Size: Adult)   Pulse (!) 114   Ht 1.905 m (6' 3\")   Wt 115.7 kg (255 lb)   SpO2 96%  Body mass index is 31.87 kg/m².    Physical Exam  Cardiac Imaging and Procedures Review:    EKG dated ***:     Echocardiogram (***):   ***    Nuclear Perfusion Imaging (***):   ***    LHC (***):   ***    EPS/Ablation- Procedural Conclusions per Dr. Bruno's/Manoj's Op Note (***):  ***    Radiology test Review:  CXR: ***     Labs (personally reviewed and notable for):   Lab Results "   Component Value Date/Time    SODIUM 141 06/17/2019 03:32 PM    POTASSIUM 4.1 06/17/2019 03:32 PM    CHLORIDE 106 06/17/2019 03:32 PM    CO2 27 06/17/2019 03:32 PM    GLUCOSE 102 (H) 06/17/2019 03:32 PM    BUN 20 06/17/2019 03:32 PM    CREATININE 1.34 06/17/2019 03:32 PM      Lab Results   Component Value Date/Time    WBC 5.1 06/17/2019 03:32 PM    RBC 5.76 06/17/2019 03:32 PM    HEMOGLOBIN 17.3 06/17/2019 03:32 PM    HEMATOCRIT 50.9 06/17/2019 03:32 PM    MCV 88.4 06/17/2019 03:32 PM    MCH 30.0 06/17/2019 03:32 PM    MCHC 34.0 06/17/2019 03:32 PM    MPV 11.1 06/17/2019 03:32 PM    NEUTSPOLYS 37.40 (L) 06/17/2019 03:32 PM    LYMPHOCYTES 51.50 (H) 06/17/2019 03:32 PM    MONOCYTES 6.10 06/17/2019 03:32 PM    EOSINOPHILS 3.80 06/17/2019 03:32 PM    BASOPHILS 0.80 06/17/2019 03:32 PM      PT/INR: No results found for: PROTHROMBTM, INR]  Assessment:     1. PAF (paroxysmal atrial fibrillation) ([facility])  EKG     Medical Decision Making:  Today's Assessment / Status / Plan:   1.1. Paroxysmal Atrial Fibrillation (PAF)  - Asymptomatic, denies AF breakthrough, rate controlled.   - Hx of *** cardioversion (last DCCV ***).  - EKG today showed ***  - On OAC with ***, continue.  - Continue ***       Plan reviewed in detail with the patient and *** verbalizes understanding and is in agreement.   RTC***, sooner if clinical condition changes.     Thank you for allowing me to participate in this patients care.  Please contact me with any questions or concerns.     AVINASH Villeda.   [facility]  [phone]    Collaborating MD/ADD: Dr. Leopoldo MD.         Irineo Bruno M.D.  [address]  [address]  [facility] [address]  VIA In Basket

## 2019-08-07 NOTE — LETTER
[facility]   [address], [facility] [address]  [place] [address]  Phone: [phone]  Fax: [phone]              Darwin Alcaraz  1990    Encounter Date: 8/7/2019    JACEK Villeda          PROGRESS NOTE:  Cardiology/Electrophysiology Follow-up Note    Subjective:   Chief Complaint:   Chief Complaint   Patient presents with   • Atrial Fibrillation     PP     Darwin Alcaraz is a 29 y.o. male who presents today for Paroxysmal Atrial Fibrillation on Diltiazem.    He is followed by Dr. Brown and was last seen by Julia JANE on 07/10/19 for PAF.  His zio monitor showed 28% AF burden with Hrs  bpm. He was started on Diltiazem and referral to Electrophysiology for further evaluation.      Medical history significant for Paroxysmal Atrial Fibrillation.     Today he states he is feeling well.  He reports intermittent palpitations and dyspnea with elevated heart rates, otherwise relatively asymptomatic.  He denies chest pain, dizziness, pre syncope or syncope, dyspnea, PND, orthopnea, or lower extremity edema.  Denies any signs or symptoms of bleeding or bleeding issues. Patient endorses medication compliance. 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     Past Medical History:   Diagnosis Date   • Paroxysmal atrial fibrillation (HCC)      Past Surgical History:   Procedure Laterality Date   • OTHER  2007    multiple facial fracture repair on right side with metal on right face     Family History   Problem Relation Age of Onset   • No Known Problems Mother    • No Known Problems Father    • No Known Problems Sister    • No Known Problems Brother    • Hypertension Maternal Grandmother    • Cancer Neg Hx    • Diabetes Neg Hx    • Heart Disease Neg Hx      Social History     Socioeconomic History   • Marital status: Single     Spouse name: Not on file   • Number of children: Not on file   • Years of  education: Not on file   • Highest education level: Not on file   Occupational History   • Not on file   Social Needs   • Financial resource strain: Not on file   • Food insecurity:     Worry: Not on file     Inability: Not on file   • Transportation needs:     Medical: Not on file     Non-medical: Not on file   Tobacco Use   • Smoking status: Never Smoker   • Smokeless tobacco: Never Used   Substance and Sexual Activity   • Alcohol use: Yes     Comment: occasionally   • Drug use: Not on file     Comment: occ   • Sexual activity: Yes     Partners: Female   Lifestyle   • Physical activity:     Days per week: Not on file     Minutes per session: Not on file   • Stress: Not on file   Relationships   • Social connections:     Talks on phone: Not on file     Gets together: Not on file     Attends Yazidism service: Not on file     Active member of club or organization: Not on file     Attends meetings of clubs or organizations: Not on file     Relationship status: Not on file   • Intimate partner violence:     Fear of current or ex partner: Not on file     Emotionally abused: Not on file     Physically abused: Not on file     Forced sexual activity: Not on file   Other Topics Concern   • Not on file   Social History Narrative   • Not on file     Allergies   Allergen Reactions   • Pcn [Penicillins]        Current Outpatient Medications   Medication Sig Dispense Refill   • flecainide (TAMBOCOR) 50 MG tablet Take 1 Tab by mouth 2 times a day. 60 Tab 3   • apixaban (ELIQUIS) 5mg Tab Take 1 Tab by mouth 2 Times a Day. 60 Tab 11   • Multiple Vitamin (MULTI-VITAMIN DAILY PO) Take  by mouth.     • cetirizine (ZYRTEC) 10 MG Tab Take 10 mg by mouth every day.     • DILTIAZem CD (CARDIZEM CD) 120 MG CAPSULE SR 24 HR Take 1 Cap by mouth every day. 90 Cap 3     No current facility-administered medications for this visit.      Review of Systems   Constitutional: Negative for chills, diaphoresis and fever.   Respiratory: Positive for  "shortness of breath (with elevated HRs). Negative for cough and wheezing.    Cardiovascular: Positive for palpitations. Negative for chest pain, orthopnea, claudication, leg swelling and PND.   Gastrointestinal: Negative for abdominal pain and blood in stool.   Genitourinary: Negative for hematuria.   Neurological: Negative for dizziness and loss of consciousness.     All others systems reviewed and negative.   Objective:     /88 (BP Location: Left arm, Patient Position: Sitting, BP Cuff Size: Adult)   Pulse (!) 114   Ht 1.905 m (6' 3\")   Wt 115.7 kg (255 lb)   SpO2 96%  Body mass index is 31.87 kg/m².    Physical Exam   Constitutional: He is oriented to person, place, and time. No distress.   HENT:   Head: Normocephalic.   Eyes: Pupils are equal, round, and reactive to light.   Neck: Normal range of motion. No JVD present.   Cardiovascular: Normal rate. An irregularly irregular rhythm present.   No murmur heard.  Pulses:       Radial pulses are 2+ on the right side, and 2+ on the left side.        Dorsalis pedis pulses are 2+ on the right side, and 2+ on the left side.   Pulmonary/Chest: Effort normal and breath sounds normal. No respiratory distress. He has no wheezes. He has no rales. He exhibits no tenderness.   Abdominal: Soft. Bowel sounds are normal.   Musculoskeletal: He exhibits no edema.   Neurological: He is alert and oriented to person, place, and time.   Skin: Skin is warm and dry. He is not diaphoretic. No erythema.   Psychiatric: Mood, memory, affect and judgment normal.   Nursing note and vitals reviewed.    Cardiac Imaging and Procedures Review:    EKG 08/07/2019: Atrial Fibrillation    Echocardiogram (06/13/2019):   No prior study is available for comparison.   Normal transthoracic echocardiogram. LVEF 65%.  Left Ventricle  Normal left ventricular size, wall thickness, and systolic function.   Left ventricular ejection fraction is visually estimated to be 65%.   Normal regional wall " motion. Normal diastolic function.    Labs (personally reviewed and notable for):   Lab Results   Component Value Date/Time    SODIUM 141 06/17/2019 03:32 PM    POTASSIUM 4.1 06/17/2019 03:32 PM    CHLORIDE 106 06/17/2019 03:32 PM    CO2 27 06/17/2019 03:32 PM    GLUCOSE 102 (H) 06/17/2019 03:32 PM    BUN 20 06/17/2019 03:32 PM    CREATININE 1.34 06/17/2019 03:32 PM      Lab Results   Component Value Date/Time    WBC 5.1 06/17/2019 03:32 PM    RBC 5.76 06/17/2019 03:32 PM    HEMOGLOBIN 17.3 06/17/2019 03:32 PM    HEMATOCRIT 50.9 06/17/2019 03:32 PM    MCV 88.4 06/17/2019 03:32 PM    MCH 30.0 06/17/2019 03:32 PM    MCHC 34.0 06/17/2019 03:32 PM    MPV 11.1 06/17/2019 03:32 PM    NEUTSPOLYS 37.40 (L) 06/17/2019 03:32 PM    LYMPHOCYTES 51.50 (H) 06/17/2019 03:32 PM    MONOCYTES 6.10 06/17/2019 03:32 PM    EOSINOPHILS 3.80 06/17/2019 03:32 PM    BASOPHILS 0.80 06/17/2019 03:32 PM      PT/INR: No results found for: PROTHROMBTM, INR]  Assessment:     1. PAF (paroxysmal atrial fibrillation) ([facility])  EKG    CL-EP ABLATION ATRIAL FIBRILLATION    Comp Metabolic Panel   2. High risk medication use      Flecainide   3. Chronic anticoagulation       Medical Decision Making:  Today's Assessment / Status / Plan:   1. Paroxysmal Atrial Fibrillation  - Relatively asymptomatic, reports intermittent palpitations and dyspnea with elevated HRs   - Zio monitor (06/2019) shows 28% AF burden, occasional PSVT, Hrs  bpm. Dr. Bruno to review.   - Per echo (06/13/19) shows normal LV function, EF 65%. No significant valvular abnormalities.  - EKG today shows Atrial Fibrillation.   - Recent labs reviewed and are relatively unremarkable. TSH normal.   - Previously tested for sleep apnea, AHI 4.   - Discussed PAF treatment options including risks/benefits for medication therapy (rate vs rhythm) vs ablation vs combination.  Patient wishes to proceed with AF ablation.   - Discussed with Dr. Bruno, will discontinue ASA, start OAC and Flecainide 50 mg  BID.  Schedule for AF ablation. Order placed.  to schedule.   - [id number] score: 0, stop ASA, start OAC with Eliquis 5 mg BID.  - On diltiazem 120 mg daily, continue.   - Will check CMP in 1 month.   - The risks, benefits,and alternatives to atrial fibrillation ablation with general anesthesia were discussed in great detail. Specific risks mentioned including bleeding, infection, arteriovenous fistula/pseudoaneurysm related to sheath placement, cardiac perforation with possible tamponade requiring pericardiocentesis or possible open heart surgery were discussed. In addition,we discussed atrial fibrillation ablation specific complications including pulmonary vein stenosis, left atrial perforation (2-4%), and nerve damage involving the recurrent laryngeal nerve, the vagus nerve with resulting gastroparesis, and the phrenic nerve.  Also mentioned was a 1/400 (0.25%) occurrence of atrial esophageal fistula, which is an abnormal communication between the esophagus and the left atrium; this complication is often fatal. Lastly the risks of death, myocardial infarction, stroke, DVT and PE were discussed. The patient verbalized understanding of these potential complications and wishes to proceed with this procedure.    Plan reviewed in detail with the patient and he verbalizes understanding and is in agreement. RTC 9 weeks, after ablation, sooner if clinical condition changes.     Thank you for allowing me to participate in this patients care.  Please contact me with any questions or concerns.     AVINASH Villeda.   [facility]  [phone]    Collaborating MD/ADD: Dr. Leopoldo MD.      Irineo Bruno M.D.  [address]   [address]  [facility] [address]  VIA In Basket

## 2019-08-08 ENCOUNTER — TELEPHONE (OUTPATIENT)
Dept: CARDIOLOGY | Facility: MEDICAL CENTER | Age: 29
End: 2019-08-08

## 2019-08-08 DIAGNOSIS — I48.0 PAF (PAROXYSMAL ATRIAL FIBRILLATION) (HCC): ICD-10-CM

## 2019-08-08 LAB — EKG IMPRESSION: NORMAL

## 2019-08-08 NOTE — TELEPHONE ENCOUNTER
Patient scheduled for an afib ablation w/BOBBY on 9-24-19 at [facility] with Dr. Bruno.

## 2019-08-12 ENCOUNTER — TELEPHONE (OUTPATIENT)
Dept: CARDIOLOGY | Facility: MEDICAL CENTER | Age: 29
End: 2019-08-12

## 2019-08-13 ENCOUNTER — TELEPHONE (OUTPATIENT)
Dept: CARDIOLOGY | Facility: MEDICAL CENTER | Age: 29
End: 2019-08-13

## 2019-08-13 NOTE — TELEPHONE ENCOUNTER
----- Message from Urbano Banks M.D. sent at 8/13/2019 12:14 PM PDT -----  Regarding: RE: Zio Patch 6.19.19  Good morning.  This patient shows a significant (28%) burden of symptomatic paroxysmal atrial fibrillation which is a new diagnosis for him.  He needs to take an aspirin 81 mg daily.  We discussed calcium channel blocker and I think this would be a good idea to use, as well as avoiding stimulants.  His echocardiogram was completely normal which is very reassuring.  This is called lone AF, and if he is continuing to have symptoms we should move up his appointment with me.  If he has not begun taking Cardizem, we should get him 120 mg Cardizem CD daily to start and again avoid pre-workout supplement and caffeine.    Thanks    ----- Message -----  From: Leatha Elizabeth, Med Ass't  Sent: 8/12/2019   3:32 PM PDT  To: Urbano Banks M.D.  Subject: Zio Patch 6.19.19                                Please dictate impression/narrative for 6.19.19 Zio Patch. Thank you.

## 2019-09-20 ENCOUNTER — APPOINTMENT (OUTPATIENT)
Dept: ADMISSIONS | Facility: MEDICAL CENTER | Age: 29
End: 2019-09-20
Payer: COMMERCIAL

## 2019-09-23 DIAGNOSIS — Z01.812 PRE-OPERATIVE LABORATORY EXAMINATION: ICD-10-CM

## 2019-09-23 DIAGNOSIS — Z01.810 PRE-OPERATIVE CARDIOVASCULAR EXAMINATION: ICD-10-CM

## 2019-09-23 LAB
ALBUMIN SERPL BCP-MCNC: 5.1 G/DL (ref 3.2–4.9)
ALBUMIN/GLOB SERPL: 2.4 G/DL
ALP SERPL-CCNC: 49 U/L (ref 30–99)
ALT SERPL-CCNC: 34 U/L (ref 2–50)
ANION GAP SERPL CALC-SCNC: 9 MMOL/L (ref 0–11.9)
AST SERPL-CCNC: 28 U/L (ref 12–45)
BASOPHILS # BLD AUTO: 0.6 % (ref 0–1.8)
BASOPHILS # BLD: 0.03 K/UL (ref 0–0.12)
BILIRUB SERPL-MCNC: 1.4 MG/DL (ref 0.1–1.5)
BUN SERPL-MCNC: 20 MG/DL (ref 8–22)
CALCIUM SERPL-MCNC: 9.9 MG/DL (ref 8.5–10.5)
CHLORIDE SERPL-SCNC: 107 MMOL/L (ref 96–112)
CO2 SERPL-SCNC: 26 MMOL/L (ref 20–33)
CREAT SERPL-MCNC: 1.5 MG/DL (ref 0.5–1.4)
EKG IMPRESSION: NORMAL
EOSINOPHIL # BLD AUTO: 0.2 K/UL (ref 0–0.51)
EOSINOPHIL NFR BLD: 3.8 % (ref 0–6.9)
ERYTHROCYTE [DISTWIDTH] IN BLOOD BY AUTOMATED COUNT: 38.9 FL (ref 35.9–50)
GLOBULIN SER CALC-MCNC: 2.1 G/DL (ref 1.9–3.5)
GLUCOSE SERPL-MCNC: 101 MG/DL (ref 65–99)
HCT VFR BLD AUTO: 47.5 % (ref 42–52)
HGB BLD-MCNC: 16.7 G/DL (ref 14–18)
IMM GRANULOCYTES # BLD AUTO: 0.01 K/UL (ref 0–0.11)
IMM GRANULOCYTES NFR BLD AUTO: 0.2 % (ref 0–0.9)
INR PPP: 1.11 (ref 0.87–1.13)
LYMPHOCYTES # BLD AUTO: 2.62 K/UL (ref 1–4.8)
LYMPHOCYTES NFR BLD: 49.6 % (ref 22–41)
MCH RBC QN AUTO: 30.9 PG (ref 27–33)
MCHC RBC AUTO-ENTMCNC: 35.2 G/DL (ref 33.7–35.3)
MCV RBC AUTO: 87.8 FL (ref 81.4–97.8)
MONOCYTES # BLD AUTO: 0.26 K/UL (ref 0–0.85)
MONOCYTES NFR BLD AUTO: 4.9 % (ref 0–13.4)
NEUTROPHILS # BLD AUTO: 2.16 K/UL (ref 1.82–7.42)
NEUTROPHILS NFR BLD: 40.9 % (ref 44–72)
NRBC # BLD AUTO: 0 K/UL
NRBC BLD-RTO: 0 /100 WBC
PLATELET # BLD AUTO: 178 K/UL (ref 164–446)
PMV BLD AUTO: 10.7 FL (ref 9–12.9)
POTASSIUM SERPL-SCNC: 4.4 MMOL/L (ref 3.6–5.5)
PROT SERPL-MCNC: 7.2 G/DL (ref 6–8.2)
PROTHROMBIN TIME: 14.6 SEC (ref 12–14.6)
RBC # BLD AUTO: 5.41 M/UL (ref 4.7–6.1)
SODIUM SERPL-SCNC: 142 MMOL/L (ref 135–145)
WBC # BLD AUTO: 5.3 K/UL (ref 4.8–10.8)

## 2019-09-23 PROCEDURE — 85025 COMPLETE CBC W/AUTO DIFF WBC: CPT

## 2019-09-23 PROCEDURE — 80053 COMPREHEN METABOLIC PANEL: CPT

## 2019-09-23 PROCEDURE — 93005 ELECTROCARDIOGRAM TRACING: CPT | Performed by: INTERNAL MEDICINE

## 2019-09-23 PROCEDURE — 93010 ELECTROCARDIOGRAM REPORT: CPT | Performed by: INTERNAL MEDICINE

## 2019-09-23 PROCEDURE — 36415 COLL VENOUS BLD VENIPUNCTURE: CPT

## 2019-09-23 PROCEDURE — 85610 PROTHROMBIN TIME: CPT

## 2019-09-24 ENCOUNTER — ANESTHESIA (OUTPATIENT)
Dept: CARDIOLOGY | Facility: MEDICAL CENTER | Age: 29
End: 2019-09-24
Payer: COMMERCIAL

## 2019-09-24 ENCOUNTER — ANESTHESIA EVENT (OUTPATIENT)
Dept: CARDIOLOGY | Facility: MEDICAL CENTER | Age: 29
End: 2019-09-24
Payer: COMMERCIAL

## 2019-09-24 ENCOUNTER — APPOINTMENT (OUTPATIENT)
Dept: CARDIOLOGY | Facility: MEDICAL CENTER | Age: 29
End: 2019-09-24
Attending: NURSE PRACTITIONER
Payer: COMMERCIAL

## 2019-09-24 ENCOUNTER — APPOINTMENT (OUTPATIENT)
Dept: CARDIOLOGY | Facility: MEDICAL CENTER | Age: 29
End: 2019-09-24
Attending: INTERNAL MEDICINE
Payer: COMMERCIAL

## 2019-09-24 ENCOUNTER — HOSPITAL ENCOUNTER (OUTPATIENT)
Facility: MEDICAL CENTER | Age: 29
End: 2019-09-25
Attending: INTERNAL MEDICINE | Admitting: INTERNAL MEDICINE
Payer: COMMERCIAL

## 2019-09-24 DIAGNOSIS — Z86.79 S/P ABLATION OF ATRIAL FIBRILLATION: ICD-10-CM

## 2019-09-24 DIAGNOSIS — Z86.79 S/P ABLATION OF ATRIAL FLUTTER: ICD-10-CM

## 2019-09-24 DIAGNOSIS — I48.0 PAF (PAROXYSMAL ATRIAL FIBRILLATION) (HCC): ICD-10-CM

## 2019-09-24 DIAGNOSIS — Z98.890 S/P ABLATION OF ATRIAL FLUTTER: ICD-10-CM

## 2019-09-24 DIAGNOSIS — Z98.890 S/P ABLATION OF ATRIAL FIBRILLATION: ICD-10-CM

## 2019-09-24 LAB
ACT BLD: 219 SEC (ref 74–137)
ACT BLD: 252 SEC (ref 74–137)

## 2019-09-24 PROCEDURE — 93005 ELECTROCARDIOGRAM TRACING: CPT | Performed by: INTERNAL MEDICINE

## 2019-09-24 PROCEDURE — 700111 HCHG RX REV CODE 636 W/ 250 OVERRIDE (IP)

## 2019-09-24 PROCEDURE — 93662 INTRACARDIAC ECG (ICE): CPT | Mod: 26 | Performed by: INTERNAL MEDICINE

## 2019-09-24 PROCEDURE — 93623 PRGRMD STIMJ&PACG IV RX NFS: CPT | Mod: 26 | Performed by: INTERNAL MEDICINE

## 2019-09-24 PROCEDURE — 700102 HCHG RX REV CODE 250 W/ 637 OVERRIDE(OP): Performed by: INTERNAL MEDICINE

## 2019-09-24 PROCEDURE — 700105 HCHG RX REV CODE 258: Performed by: ANESTHESIOLOGY

## 2019-09-24 PROCEDURE — G0378 HOSPITAL OBSERVATION PER HR: HCPCS

## 2019-09-24 PROCEDURE — 93312 ECHO TRANSESOPHAGEAL: CPT | Mod: 26,52 | Performed by: INTERNAL MEDICINE

## 2019-09-24 PROCEDURE — 93010 ELECTROCARDIOGRAM REPORT: CPT | Mod: 59 | Performed by: INTERNAL MEDICINE

## 2019-09-24 PROCEDURE — 93655 ICAR CATH ABLTJ DSCRT ARRHYT: CPT | Performed by: INTERNAL MEDICINE

## 2019-09-24 PROCEDURE — 700101 HCHG RX REV CODE 250

## 2019-09-24 PROCEDURE — 700101 HCHG RX REV CODE 250: Performed by: ANESTHESIOLOGY

## 2019-09-24 PROCEDURE — 85347 COAGULATION TIME ACTIVATED: CPT

## 2019-09-24 PROCEDURE — 93656 COMPRE EP EVAL ABLTJ ATR FIB: CPT | Performed by: INTERNAL MEDICINE

## 2019-09-24 PROCEDURE — 93662 INTRACARDIAC ECG (ICE): CPT

## 2019-09-24 PROCEDURE — 93613 INTRACARDIAC EPHYS 3D MAPG: CPT | Performed by: INTERNAL MEDICINE

## 2019-09-24 PROCEDURE — A9270 NON-COVERED ITEM OR SERVICE: HCPCS | Performed by: INTERNAL MEDICINE

## 2019-09-24 PROCEDURE — 93325 DOPPLER ECHO COLOR FLOW MAPG: CPT

## 2019-09-24 PROCEDURE — 160002 HCHG RECOVERY MINUTES (STAT)

## 2019-09-24 PROCEDURE — 700111 HCHG RX REV CODE 636 W/ 250 OVERRIDE (IP): Performed by: ANESTHESIOLOGY

## 2019-09-24 RX ORDER — HALOPERIDOL 5 MG/ML
1 INJECTION INTRAMUSCULAR
Status: DISCONTINUED | OUTPATIENT
Start: 2019-09-24 | End: 2019-09-24 | Stop reason: HOSPADM

## 2019-09-24 RX ORDER — DIPHENHYDRAMINE HYDROCHLORIDE 50 MG/ML
12.5 INJECTION INTRAMUSCULAR; INTRAVENOUS
Status: DISCONTINUED | OUTPATIENT
Start: 2019-09-24 | End: 2019-09-24 | Stop reason: HOSPADM

## 2019-09-24 RX ORDER — MEPERIDINE HYDROCHLORIDE 25 MG/ML
25 INJECTION INTRAMUSCULAR; INTRAVENOUS; SUBCUTANEOUS
Status: DISCONTINUED | OUTPATIENT
Start: 2019-09-24 | End: 2019-09-24 | Stop reason: HOSPADM

## 2019-09-24 RX ORDER — METOPROLOL TARTRATE 1 MG/ML
1 INJECTION, SOLUTION INTRAVENOUS
Status: DISCONTINUED | OUTPATIENT
Start: 2019-09-24 | End: 2019-09-24 | Stop reason: HOSPADM

## 2019-09-24 RX ORDER — PROTAMINE SULFATE 10 MG/ML
INJECTION, SOLUTION INTRAVENOUS PRN
Status: DISCONTINUED | OUTPATIENT
Start: 2019-09-24 | End: 2019-09-24 | Stop reason: SURG

## 2019-09-24 RX ORDER — LIDOCAINE HYDROCHLORIDE 40 MG/ML
SOLUTION TOPICAL
Status: COMPLETED
Start: 2019-09-24 | End: 2019-09-24

## 2019-09-24 RX ORDER — SODIUM CHLORIDE, SODIUM LACTATE, POTASSIUM CHLORIDE, CALCIUM CHLORIDE 600; 310; 30; 20 MG/100ML; MG/100ML; MG/100ML; MG/100ML
INJECTION, SOLUTION INTRAVENOUS CONTINUOUS
Status: DISCONTINUED | OUTPATIENT
Start: 2019-09-24 | End: 2019-09-25 | Stop reason: HOSPADM

## 2019-09-24 RX ORDER — LIDOCAINE HYDROCHLORIDE 40 MG/ML
SOLUTION TOPICAL PRN
Status: DISCONTINUED | OUTPATIENT
Start: 2019-09-24 | End: 2019-09-24 | Stop reason: SURG

## 2019-09-24 RX ORDER — ACETAMINOPHEN 325 MG/1
650 TABLET ORAL EVERY 4 HOURS PRN
Status: DISCONTINUED | OUTPATIENT
Start: 2019-09-24 | End: 2019-09-25 | Stop reason: HOSPADM

## 2019-09-24 RX ORDER — DILTIAZEM HYDROCHLORIDE 120 MG/1
120 CAPSULE, COATED, EXTENDED RELEASE ORAL DAILY
Status: DISCONTINUED | OUTPATIENT
Start: 2019-09-24 | End: 2019-09-25 | Stop reason: HOSPADM

## 2019-09-24 RX ORDER — ONDANSETRON 2 MG/ML
4 INJECTION INTRAMUSCULAR; INTRAVENOUS EVERY 6 HOURS PRN
Status: DISCONTINUED | OUTPATIENT
Start: 2019-09-24 | End: 2019-09-25 | Stop reason: HOSPADM

## 2019-09-24 RX ORDER — OXYCODONE HCL 5 MG/5 ML
10 SOLUTION, ORAL ORAL
Status: DISCONTINUED | OUTPATIENT
Start: 2019-09-24 | End: 2019-09-24 | Stop reason: HOSPADM

## 2019-09-24 RX ORDER — HEPARIN SODIUM,PORCINE 1000/ML
VIAL (ML) INJECTION
Status: COMPLETED
Start: 2019-09-24 | End: 2019-09-24

## 2019-09-24 RX ORDER — SODIUM CHLORIDE, SODIUM LACTATE, POTASSIUM CHLORIDE, CALCIUM CHLORIDE 600; 310; 30; 20 MG/100ML; MG/100ML; MG/100ML; MG/100ML
INJECTION, SOLUTION INTRAVENOUS
Status: DISCONTINUED | OUTPATIENT
Start: 2019-09-24 | End: 2019-09-24 | Stop reason: SURG

## 2019-09-24 RX ORDER — ISOPROTERENOL HYDROCHLORIDE 0.2 MG/ML
INJECTION, SOLUTION INTRAVENOUS
Status: COMPLETED
Start: 2019-09-24 | End: 2019-09-24

## 2019-09-24 RX ORDER — TRAMADOL HYDROCHLORIDE 50 MG/1
50 TABLET ORAL EVERY 6 HOURS PRN
Status: DISCONTINUED | OUTPATIENT
Start: 2019-09-24 | End: 2019-09-25 | Stop reason: HOSPADM

## 2019-09-24 RX ORDER — MEPERIDINE HYDROCHLORIDE 25 MG/ML
INJECTION INTRAMUSCULAR; INTRAVENOUS; SUBCUTANEOUS PRN
Status: DISCONTINUED | OUTPATIENT
Start: 2019-09-24 | End: 2019-09-24 | Stop reason: SURG

## 2019-09-24 RX ORDER — OMEPRAZOLE 20 MG/1
20 CAPSULE, DELAYED RELEASE ORAL
Status: DISCONTINUED | OUTPATIENT
Start: 2019-09-25 | End: 2019-09-25 | Stop reason: HOSPADM

## 2019-09-24 RX ORDER — SUCRALFATE 1 G/1
1 TABLET ORAL
Status: DISCONTINUED | OUTPATIENT
Start: 2019-09-24 | End: 2019-09-25 | Stop reason: HOSPADM

## 2019-09-24 RX ORDER — HEPARIN SODIUM 200 [USP'U]/100ML
INJECTION, SOLUTION INTRAVENOUS
Status: COMPLETED
Start: 2019-09-24 | End: 2019-09-24

## 2019-09-24 RX ORDER — PROTAMINE SULFATE 10 MG/ML
INJECTION, SOLUTION INTRAVENOUS
Status: COMPLETED
Start: 2019-09-24 | End: 2019-09-24

## 2019-09-24 RX ORDER — ONDANSETRON 2 MG/ML
INJECTION INTRAMUSCULAR; INTRAVENOUS PRN
Status: DISCONTINUED | OUTPATIENT
Start: 2019-09-24 | End: 2019-09-24 | Stop reason: SURG

## 2019-09-24 RX ORDER — METOCLOPRAMIDE HYDROCHLORIDE 5 MG/ML
INJECTION INTRAMUSCULAR; INTRAVENOUS PRN
Status: DISCONTINUED | OUTPATIENT
Start: 2019-09-24 | End: 2019-09-24 | Stop reason: SURG

## 2019-09-24 RX ORDER — ONDANSETRON 2 MG/ML
4 INJECTION INTRAMUSCULAR; INTRAVENOUS
Status: DISCONTINUED | OUTPATIENT
Start: 2019-09-24 | End: 2019-09-24 | Stop reason: HOSPADM

## 2019-09-24 RX ORDER — OXYCODONE HCL 5 MG/5 ML
5 SOLUTION, ORAL ORAL
Status: DISCONTINUED | OUTPATIENT
Start: 2019-09-24 | End: 2019-09-24 | Stop reason: HOSPADM

## 2019-09-24 RX ORDER — DEXAMETHASONE SODIUM PHOSPHATE 4 MG/ML
INJECTION, SOLUTION INTRA-ARTICULAR; INTRALESIONAL; INTRAMUSCULAR; INTRAVENOUS; SOFT TISSUE PRN
Status: DISCONTINUED | OUTPATIENT
Start: 2019-09-24 | End: 2019-09-24 | Stop reason: SURG

## 2019-09-24 RX ADMIN — SUCCINYLCHOLINE CHLORIDE 180 MG: 20 INJECTION, SOLUTION INTRAMUSCULAR; INTRAVENOUS at 10:12

## 2019-09-24 RX ADMIN — ROCURONIUM BROMIDE 20 MG: 10 INJECTION, SOLUTION INTRAVENOUS at 11:08

## 2019-09-24 RX ADMIN — HEPARIN SODIUM: 1000 INJECTION, SOLUTION INTRAVENOUS; SUBCUTANEOUS at 10:17

## 2019-09-24 RX ADMIN — DILTIAZEM HYDROCHLORIDE 120 MG: 120 CAPSULE, COATED, EXTENDED RELEASE ORAL at 17:59

## 2019-09-24 RX ADMIN — SUCRALFATE 1 G: 1 TABLET ORAL at 21:11

## 2019-09-24 RX ADMIN — ROCURONIUM BROMIDE 5 MG: 10 INJECTION, SOLUTION INTRAVENOUS at 10:09

## 2019-09-24 RX ADMIN — ONDANSETRON 4 MG: 2 INJECTION INTRAMUSCULAR; INTRAVENOUS at 12:24

## 2019-09-24 RX ADMIN — LIDOCAINE HYDROCHLORIDE 20 MG: 20 INJECTION, SOLUTION INFILTRATION; PERINEURAL at 10:09

## 2019-09-24 RX ADMIN — PROPOFOL 40 MG: 10 INJECTION, EMULSION INTRAVENOUS at 12:29

## 2019-09-24 RX ADMIN — DEXAMETHASONE SODIUM PHOSPHATE 4 MG: 4 INJECTION, SOLUTION INTRA-ARTICULAR; INTRALESIONAL; INTRAMUSCULAR; INTRAVENOUS; SOFT TISSUE at 10:15

## 2019-09-24 RX ADMIN — ISOPROTERENOL HYDROCHLORIDE 200 MCG: 0.2 INJECTION, SOLUTION INTRACARDIAC; INTRAMUSCULAR; INTRAVENOUS; SUBCUTANEOUS at 12:21

## 2019-09-24 RX ADMIN — SUCRALFATE 1 G: 1 TABLET ORAL at 17:59

## 2019-09-24 RX ADMIN — HEPARIN SODIUM 2000 UNITS: 1000 INJECTION, SOLUTION INTRAVENOUS; SUBCUTANEOUS at 10:17

## 2019-09-24 RX ADMIN — ROCURONIUM BROMIDE 20 MG: 10 INJECTION, SOLUTION INTRAVENOUS at 11:29

## 2019-09-24 RX ADMIN — ROCURONIUM BROMIDE 30 MG: 10 INJECTION, SOLUTION INTRAVENOUS at 10:16

## 2019-09-24 RX ADMIN — MEPERIDINE HYDROCHLORIDE 25 MG: 25 INJECTION INTRAMUSCULAR; INTRAVENOUS; SUBCUTANEOUS at 10:21

## 2019-09-24 RX ADMIN — SUGAMMADEX 200 MG: 100 INJECTION, SOLUTION INTRAVENOUS at 12:30

## 2019-09-24 RX ADMIN — PROPOFOL 30 MG: 10 INJECTION, EMULSION INTRAVENOUS at 10:09

## 2019-09-24 RX ADMIN — PROTAMINE SULFATE 40 MG: 10 INJECTION, SOLUTION INTRAVENOUS at 12:17

## 2019-09-24 RX ADMIN — ROCURONIUM BROMIDE 20 MG: 10 INJECTION, SOLUTION INTRAVENOUS at 10:47

## 2019-09-24 RX ADMIN — ALFENTANIL HYDROCHLORIDE 1000 MCG: 500 INJECTION, SOLUTION INTRAVENOUS at 10:09

## 2019-09-24 RX ADMIN — PROTAMINE SULFATE 10 MG: 10 INJECTION, SOLUTION INTRAVENOUS at 12:38

## 2019-09-24 RX ADMIN — PROPOFOL 50 MG: 10 INJECTION, EMULSION INTRAVENOUS at 10:13

## 2019-09-24 RX ADMIN — PROPOFOL 100 MG: 10 INJECTION, EMULSION INTRAVENOUS at 10:12

## 2019-09-24 RX ADMIN — LIDOCAINE HYDROCHLORIDE 160 MG: 40 SOLUTION TOPICAL at 10:13

## 2019-09-24 RX ADMIN — MEPERIDINE HYDROCHLORIDE 25 MG: 25 INJECTION INTRAMUSCULAR; INTRAVENOUS; SUBCUTANEOUS at 12:26

## 2019-09-24 RX ADMIN — SODIUM CHLORIDE, POTASSIUM CHLORIDE, SODIUM LACTATE AND CALCIUM CHLORIDE: 600; 310; 30; 20 INJECTION, SOLUTION INTRAVENOUS at 10:03

## 2019-09-24 RX ADMIN — APIXABAN 5 MG: 5 TABLET, FILM COATED ORAL at 18:00

## 2019-09-24 RX ADMIN — METOCLOPRAMIDE 10 MG: 5 INJECTION, SOLUTION INTRAMUSCULAR; INTRAVENOUS at 12:32

## 2019-09-24 RX ADMIN — HEPARIN SODIUM: 1000 INJECTION, SOLUTION INTRAVENOUS; SUBCUTANEOUS at 12:21

## 2019-09-24 RX ADMIN — ROCURONIUM BROMIDE 20 MG: 10 INJECTION, SOLUTION INTRAVENOUS at 11:51

## 2019-09-24 ASSESSMENT — COGNITIVE AND FUNCTIONAL STATUS - GENERAL
SUGGESTED CMS G CODE MODIFIER MOBILITY: CH
MOBILITY SCORE: 24
SUGGESTED CMS G CODE MODIFIER DAILY ACTIVITY: CH
DAILY ACTIVITIY SCORE: 24

## 2019-09-24 ASSESSMENT — PATIENT HEALTH QUESTIONNAIRE - PHQ9
SUM OF ALL RESPONSES TO PHQ9 QUESTIONS 1 AND 2: 0
2. FEELING DOWN, DEPRESSED, IRRITABLE, OR HOPELESS: NOT AT ALL
1. LITTLE INTEREST OR PLEASURE IN DOING THINGS: NOT AT ALL

## 2019-09-24 ASSESSMENT — LIFESTYLE VARIABLES
HAVE PEOPLE ANNOYED YOU BY CRITICIZING YOUR DRINKING: NO
EVER_SMOKED: NEVER
ALCOHOL_USE: NO
EVER HAD A DRINK FIRST THING IN THE MORNING TO STEADY YOUR NERVES TO GET RID OF A HANGOVER: NO
TOTAL SCORE: 0
HAVE YOU EVER FELT YOU SHOULD CUT DOWN ON YOUR DRINKING: NO
ON A TYPICAL DAY WHEN YOU DRINK ALCOHOL HOW MANY DRINKS DO YOU HAVE: 0
TOTAL SCORE: 0
CONSUMPTION TOTAL: NEGATIVE
TOTAL SCORE: 0
AVERAGE NUMBER OF DAYS PER WEEK YOU HAVE A DRINK CONTAINING ALCOHOL: 0
HOW MANY TIMES IN THE PAST YEAR HAVE YOU HAD 5 OR MORE DRINKS IN A DAY: 0
EVER FELT BAD OR GUILTY ABOUT YOUR DRINKING: NO

## 2019-09-24 ASSESSMENT — COPD QUESTIONNAIRES
DURING THE PAST 4 WEEKS HOW MUCH DID YOU FEEL SHORT OF BREATH: NONE/LITTLE OF THE TIME
IN THE PAST 12 MONTHS DO YOU DO LESS THAN YOU USED TO BECAUSE OF YOUR BREATHING PROBLEMS: DISAGREE/UNSURE
HAVE YOU SMOKED AT LEAST 100 CIGARETTES IN YOUR ENTIRE LIFE: NO/DON'T KNOW
DO YOU EVER COUGH UP ANY MUCUS OR PHLEGM?: NO/ONLY WITH OCCASIONAL COLDS OR INFECTIONS
COPD SCREENING SCORE: 0

## 2019-09-24 ASSESSMENT — PAIN SCALES - GENERAL: PAIN_LEVEL: 0

## 2019-09-24 NOTE — ANESTHESIA PREPROCEDURE EVALUATION
Relevant Problems   No relevant active problems       Physical Exam    Airway   Mallampati: II  TM distance: >3 FB  Neck ROM: full       Cardiovascular - normal exam  Rhythm: regular  Rate: normal     Dental - normal exam         Pulmonary - normal exam  Breath sounds clear to auscultation     Abdominal    Neurological - normal exam                 Anesthesia Plan    ASA 2       Plan - general       Airway plan will be ETT        Induction: intravenous    Postoperative Plan: Postoperative administration of opioids is intended.    Pertinent diagnostic labs and testing reviewed    Informed Consent:    Anesthetic plan and risks discussed with patient.    Use of blood products discussed with: patient whom consented to blood products.

## 2019-09-24 NOTE — ANESTHESIA POSTPROCEDURE EVALUATION
Patient: Darwin Alcaraz    Procedure Summary     Date:  09/24/19 Room / Location:  [facility] IMAGING - CATH LAB [facility]    Anesthesia Start:  1003 Anesthesia Stop:  1248    Procedure:  CL-EP ABLATION ATRIAL FIBRILLATION Diagnosis:       PAF (paroxysmal atrial fibrillation) (HCC)      (See Associated Dx)    Scheduled Providers:  Irineo Bruno M.D. Responsible Provider:  Ovi Phoenix M.D.    Anesthesia Type:  general ASA Status:  2          Final Anesthesia Type: general  Last vitals  BP   Blood Pressure: 141/88    Temp   37.1 °C (98.8 °F)    Pulse   Pulse: 78   Resp   16    SpO2   95 %      Anesthesia Post Evaluation    Patient location during evaluation: PACU  Patient participation: complete - patient participated  Level of consciousness: awake and alert  Pain score: 0    Airway patency: patent  Anesthetic complications: no  Cardiovascular status: hemodynamically stable  Respiratory status: acceptable  Hydration status: euvolemic    PONV: none           Nurse Pain Score: 0 (NPRS)

## 2019-09-24 NOTE — ANESTHESIA QCDR
2019 [facility] Clinical Data Registry (for Quality Improvement)     Postoperative nausea/vomiting risk protocol (Adult = 18 yrs and Pediatric 3-17 yrs)- (430 and 463)  General inhalation anesthetic (NOT TIVA) with PONV risk factors: Yes  Provision of anti-emetic therapy with at least 2 different classes of agents: Yes   Patient DID NOT receive anti-emetic therapy and reason is documented in Medical Record:  N/A    Multimodal Pain Management- (AQI59)  Patient undergoing Elective Surgery (i.e. Outpatient, or ASC, or Prescheduled Surgery prior to Hospital Admission): No  Use of Multimodal Pain Management, two or more drugs and/or interventions, NOT including systemic opioids: N/A  Exception: Documented allergy to multiple classes of analgesics: N/A    PACU assessment of acute postoperative pain prior to Anesthesia Care End- Applies to Patients Age = 18- (ABG7)  Initial PACU pain score is which of the following: < 7/10  Patient unable to report pain score: N/A    Post-anesthetic transfer of care checklist/protocol to PACU/ICU- (426 and 427)  Upon conclusion of case, patient transferred to which of the following locations: PACU/Non-ICU  Use of transfer checklist/protocol: Yes  Exclusion: Service Performed in Patient Hospital Room (and thus did not require transfer): N/A    PACU Reintubation- (AQI31)  General anesthesia requiring endotracheal intubation (ETT) along with subsequent extubation in OR or PACU: Yes  Required reintubation in the PACU: No   Extubation was a planned trial documented in the medical record prior to removal of the original airway device:  N/A    Unplanned admission to ICU related to anesthesia service up through end of PACU care- (MD51)  Unplanned admission to ICU (not initially anticipated at anesthesia start time): No

## 2019-09-24 NOTE — OR NURSING
1245   PT RECEIVED FROM CATH LAB,  S/P SUCCESSFUL AFIB ABLATION UNDER ANESTHESIA.  PT IS A/A/OX4.  DENIES ANY PAIN.  REPORT RECEIVED FROM ANESTHESIA.  RIGHT RADIAL ART LINE DC PER ANESTHESIA'S ORDER.  RIGHT AND LEFT GROIN SITES CHECKED AND CLEAR,  DRESSING C/D/I.    1300   BILATERAL GROINS CHECKED AND CLEAR.    1315    BILATERAL GROINS CHECKED AND CLEAR.    1330   BILATERAL GROINS CHECKED AND CLEAR.  PT TAKING PO FLUID WELL.    1430   BILATERAL GROINS CHECKED AND CLEAR.  FAMILY IS @ BEDSIDE.    1530   BILATERAL GROINS CHECKED AND CLEAR.  PT SLEEPING QUIETLY.    1630   BILATERAL GROINS CHECKED AND CLEAR.      1700   REPORT GIVEN TO JANES ABRAHAM ON TELE 8TH.      1710   PT TRANSPORTED TO  82-1 VIA BED,  ON MONITOR,  NO OXYGEN,  ACCOMPANIED BY 2 RN'S.

## 2019-09-24 NOTE — ANESTHESIA PROCEDURE NOTES
Arterial Line  Performed by: Ovi Phoenix M.D.  Authorized by: Ovi Phoenix M.D.     Start Time:  9/24/2019 9:25 AM  End Time:  9/24/2019 9:31 AM  Patient Location:  Pre-op  Catheter Size:  20 G  Seldinger Technique?: No    Laterality:  Right  Site:  Radial artery  Line Secured:  Transparent dressing  Events: patient tolerated procedure well with no complications

## 2019-09-24 NOTE — OP REPORT
Electrophysiology Procedure Note    Procedure(s) Performed:   1) Atrial fibrillation ablation and EP study  2) Ablation of additional mechanism of tachycardia (RA flutter)  3) 3D mapping  4) ICE during diagnostics and intervention  5) BOBBY  6) Arrhythmia induction with IV drug infusion    Indication(s):  Paroxysmal atrial fibrillation    Physician(s): Irineo Bruno M.D.     Resident/Assistant(s): None     Anesthesia: General endotracheal anesthetic.     Specimen(s) Removed: None     Estimated Blood Loss:  30cc     Complications:  None     Description of Procedure:   After informed written consent, the patient was brought to the EP lab in the fasting, non-sedated state. The patient was prepped and draped in the usual sterile fashion. BOBBY was done to exclude LA appendage thrombus and will be dictated separately. Femoral venous access was obtained using the modified Seldinger technique. In the left femoral vein, 2 sheaths (6, 8 Fr) were inserted over 0.035” guidewires. In the right femoral vein, 2 sheaths (8,8 Fr) were inserted over 0.035” guidewires. A deflectable decapolar catheter was advanced to the CS position. Baseline rhythm was sinus. An intracardiac echo (ICE) catheter was advanced to the right atrium. ICE was used to identify the atrial septum, left atrial appendage, and pulmonary veins and ninfa the anatomic structures to superimpose on our 3D electroanatomic map using CARTOSound. During the procedure, ICE was utilized to localize the ablation catheter, monitor for thrombus formation, and to exclude pericardial effusion. Intravenous heparin was administered to maintain -350 seconds. Double transseptal left heart catheterization was performed under intracardiac echo and hemodynamic guidance. A Gasquet needle was inserted into the 8.5 Fr sheaths ([employer] Torflex) for transseptal puncture and placement of sheaths in the left atrium. Mapping of the pulmonary veins and left atrium was performed using CARTO3 FAM  and a deflectable multipolar mapping catheter (B[employer]ense PentaRay). Wide antral circumferential ablation was performed using an 3.5 mm deflectable irrigated force contact catheter (Biosense SmartTouch) at primarily 30 W power starting from the L sided veins and then proceeding along to the RSPV and then finally the RIPV. Bidirectional pulmonary vein antrum isolation was verified by pacing inside the vein and confirming exit block along with absence of local PV signals on the PentaRay. There was frequent independent firing from PVs, especially LSPV that did not have global capture. Arrhythmia induction was performed with and without IV isuprel infusion at 2 mcg/min. Burst pacing down to 240 msec induced typical RA flutter. A RAMP sheath was used and the CTI was ablated at 40W terminating typical flutter. Block along the CTI was verified with trans-isthmus conduction time of 160 msec. Further arrhythmia induction with burst pacing failed to induce arrhythmia. At the end of the procedure, heparin was reversed with protamine, the catheter and sheaths were removed, and hemostasis was achieved by manual compression. Following recovery from anesthesia, the patient was transferred to the PPU in good condition.       Total ablation time: 1683 seconds    Fluoroscopy time: 5.0 minutes     Electrophysiologic Findings:    1. Sinus 703 ms,  ms, HV 42 ms. AVBCL = 310 ms.    Impressions:    1. Paroxysmal atrial fibrillation and inducible RA flutter    2. Successful RF ablation pulmonary vein isolation procedure.    3. Successful RF ablation of RA flutter     Recommendations:  1. Resume anticoagulation.  2. Start carafate BID x 2 weeks and daily PPI x 1 month.  3. Admit to monitored bedrest.

## 2019-09-24 NOTE — ANESTHESIA PROCEDURE NOTES
Airway  Date/Time: 9/24/2019 10:13 AM  Performed by: Ovi Phoenix M.D.  Authorized by: Ovi Phoenix M.D.     Location:  OR  Urgency:  Elective  Indications for Airway Management:  Anesthesia  Spontaneous Ventilation: absent    Sedation Level:  Deep  Preoxygenated: Yes    Patient Position:  Sniffing  Final Airway Type:  Endotracheal airway  Final Endotracheal Airway:  ETT  Cuffed: Yes    Technique Used for Successful ETT Placement:  Direct laryngoscopy  Devices/Methods Used in Placement:  Intubating stylet  Insertion Site:  Oral  Blade Type:  Spears  Laryngoscope Blade/Videolaryngoscope Blade Size:  2  ETT Size (mm):  8.0  Measured from:  Lips  ETT to Lips (cm):  22  Placement Verified by: auscultation and capnometry    Cormack-Lehane Classification:  Grade I - full view of glottis  Number of Attempts at Approach:  1

## 2019-09-24 NOTE — ANESTHESIA TIME REPORT
Anesthesia Start and Stop Event Times     Date Time Event    9/24/2019 0954 Ready for Procedure     1003 Anesthesia Start     1248 Anesthesia Stop        Responsible Staff  09/24/19    Name Role Begin End    Ovi Phoenix M.D. Anesth 1003 1248        Preop Diagnosis (Free Text):  Pre-op Diagnosis             Preop Diagnosis (Codes):    Post op Diagnosis  Atrial fibrillation (HCC)      Premium Reason  Non-Premium    Comments:

## 2019-09-24 NOTE — H&P
EP Pre-procedure History and Physical    DOS: 9/24/2019    Chief complaint/Reason for consult: PAF    Interval History:  Patient is a 30 yo M. No previous medical history other than PAF. Refractory to IC agents. Here for PVI.    ROS (+ highlighted in red):  Constitutional: Fevers/chills/fatigue/weightloss  HEENT: Blurry vision/eye pain/sore throat/hearing loss  Respiratory: Shortness of breath/cough  Cardiovascular: Chest pain/palpitations/edema/orthopnea/syncope  GI: Nausea/vomitting/diarrhea  MSK: Arthralgias/myagias/muscle weakness  Skin: Rash/sores  Neurological: Numbness/tremors/vertigo  Endocrine: Excessive thirst/polyuria/cold intolerance/heat intolerance  Psych: Depression/anxiety    Past Medical History:   Diagnosis Date   • Breath shortness     r/t afib   • Paroxysmal atrial fibrillation (HCC)        Past Surgical History:   Procedure Laterality Date   • OTHER  2007    multiple facial fracture repair on right side with metal on right face       Social History     Socioeconomic History   • Marital status:      Spouse name: Not on file   • Number of children: Not on file   • Years of education: Not on file   • Highest education level: Not on file   Occupational History   • Not on file   Social Needs   • Financial resource strain: Not on file   • Food insecurity:     Worry: Not on file     Inability: Not on file   • Transportation needs:     Medical: Not on file     Non-medical: Not on file   Tobacco Use   • Smoking status: Never Smoker   • Smokeless tobacco: Never Used   Substance and Sexual Activity   • Alcohol use: Yes     Comment: 2 per month   • Drug use: Not Currently     Comment: occ   • Sexual activity: Yes     Partners: Female   Lifestyle   • Physical activity:     Days per week: Not on file     Minutes per session: Not on file   • Stress: Not on file   Relationships   • Social connections:     Talks on phone: Not on file     Gets together: Not on file     Attends Buddhism service: Not on  "file     Active member of club or organization: Not on file     Attends meetings of clubs or organizations: Not on file     Relationship status: Not on file   • Intimate partner violence:     Fear of current or ex partner: Not on file     Emotionally abused: Not on file     Physically abused: Not on file     Forced sexual activity: Not on file   Other Topics Concern   • Not on file   Social History Narrative   • Not on file       Family History   Problem Relation Age of Onset   • No Known Problems Mother    • No Known Problems Father    • No Known Problems Sister    • No Known Problems Brother    • Hypertension Maternal Grandmother    • Cancer Neg Hx    • Diabetes Neg Hx    • Heart Disease Neg Hx        Allergies   Allergen Reactions   • Pcn [Penicillins] Hives     Amoxicillin- HIVES       No current facility-administered medications for this encounter.        Physical Exam:  Vitals:    09/23/19 1434 09/24/19 0821   BP:  141/88   Pulse:  78   Resp:  16   Temp:  37.1 °C (98.8 °F)   TempSrc:  Temporal   SpO2:  95%   Weight: 120.5 kg (265 lb 10.5 oz) 117.9 kg (259 lb 14.8 oz)   Height: 1.905 m (6' 3\") 1.905 m (6' 3\")     General appearance: NAD, conversant   Eyes: anicteric sclerae, moist conjunctivae; no lid-lag; PERRLA  HENT: Atraumatic; oropharynx clear with moist mucous membranes and no mucosal ulcerations; normal hard and soft palate  Neck: Trachea midline; FROM, supple, no thyromegaly or lymphadenopathy  Lungs: CTA, with normal respiratory effort and no intercostal retractions  CV: RRR, no MRGs, no JVD  Abdomen: Soft, non-tender; no masses or HSM  Extremities: No peripheral edema or extremity lymphadenopathy  Skin: Normal temperature, turgor and texture; no rash, ulcers or subcutaneous nodules  Psych: Appropriate affect, alert and oriented to person, place and time    Data:  Labs reviewed    Prior echo/stress reviewed:  Normal echo    EKG interpreted by " me:  AF    Impression/Plan:  1)PAF    -Risks/benefits/alternatives discussed  -All questions answered  -Proceed with BOBBY to clear RHINA and if cleared go ahead with AF ablation    Irineo Bruno MD

## 2019-09-25 VITALS
DIASTOLIC BLOOD PRESSURE: 78 MMHG | OXYGEN SATURATION: 95 % | WEIGHT: 265.43 LBS | SYSTOLIC BLOOD PRESSURE: 130 MMHG | HEART RATE: 69 BPM | TEMPERATURE: 98.4 F | BODY MASS INDEX: 33 KG/M2 | RESPIRATION RATE: 16 BRPM | HEIGHT: 75 IN

## 2019-09-25 PROBLEM — Z86.79 S/P ABLATION OF ATRIAL FIBRILLATION: Status: ACTIVE | Noted: 2019-09-25

## 2019-09-25 PROBLEM — Z86.79 S/P ABLATION OF ATRIAL FLUTTER: Status: ACTIVE | Noted: 2019-09-25

## 2019-09-25 PROBLEM — Z98.890 S/P ABLATION OF ATRIAL FLUTTER: Status: ACTIVE | Noted: 2019-09-25

## 2019-09-25 PROBLEM — Z98.890 S/P ABLATION OF ATRIAL FIBRILLATION: Status: ACTIVE | Noted: 2019-09-25

## 2019-09-25 PROBLEM — I48.0 PAF (PAROXYSMAL ATRIAL FIBRILLATION) (HCC): Status: ACTIVE | Noted: 2019-09-25

## 2019-09-25 LAB
ALBUMIN SERPL BCP-MCNC: 4.6 G/DL (ref 3.2–4.9)
ALBUMIN/GLOB SERPL: 2.9 G/DL
ALP SERPL-CCNC: 41 U/L (ref 30–99)
ALT SERPL-CCNC: 23 U/L (ref 2–50)
ANION GAP SERPL CALC-SCNC: 9 MMOL/L (ref 0–11.9)
AST SERPL-CCNC: 25 U/L (ref 12–45)
BILIRUB SERPL-MCNC: 1.6 MG/DL (ref 0.1–1.5)
BUN SERPL-MCNC: 16 MG/DL (ref 8–22)
CALCIUM SERPL-MCNC: 9.2 MG/DL (ref 8.5–10.5)
CHLORIDE SERPL-SCNC: 107 MMOL/L (ref 96–112)
CO2 SERPL-SCNC: 23 MMOL/L (ref 20–33)
CREAT SERPL-MCNC: 1.11 MG/DL (ref 0.5–1.4)
EKG IMPRESSION: NORMAL
EKG IMPRESSION: NORMAL
ERYTHROCYTE [DISTWIDTH] IN BLOOD BY AUTOMATED COUNT: 38.6 FL (ref 35.9–50)
GLOBULIN SER CALC-MCNC: 1.6 G/DL (ref 1.9–3.5)
GLUCOSE SERPL-MCNC: 100 MG/DL (ref 65–99)
HCT VFR BLD AUTO: 40.8 % (ref 42–52)
HGB BLD-MCNC: 14.5 G/DL (ref 14–18)
MCH RBC QN AUTO: 31.1 PG (ref 27–33)
MCHC RBC AUTO-ENTMCNC: 35.5 G/DL (ref 33.7–35.3)
MCV RBC AUTO: 87.6 FL (ref 81.4–97.8)
PLATELET # BLD AUTO: 178 K/UL (ref 164–446)
PMV BLD AUTO: 10.2 FL (ref 9–12.9)
POTASSIUM SERPL-SCNC: 3.7 MMOL/L (ref 3.6–5.5)
PROT SERPL-MCNC: 6.2 G/DL (ref 6–8.2)
RBC # BLD AUTO: 4.66 M/UL (ref 4.7–6.1)
SODIUM SERPL-SCNC: 139 MMOL/L (ref 135–145)
WBC # BLD AUTO: 10 K/UL (ref 4.8–10.8)

## 2019-09-25 PROCEDURE — 80053 COMPREHEN METABOLIC PANEL: CPT

## 2019-09-25 PROCEDURE — 99217 PR OBSERVATION CARE DISCHARGE: CPT | Performed by: NURSE PRACTITIONER

## 2019-09-25 PROCEDURE — 90471 IMMUNIZATION ADMIN: CPT

## 2019-09-25 PROCEDURE — 85027 COMPLETE CBC AUTOMATED: CPT

## 2019-09-25 PROCEDURE — 36415 COLL VENOUS BLD VENIPUNCTURE: CPT

## 2019-09-25 PROCEDURE — G0378 HOSPITAL OBSERVATION PER HR: HCPCS

## 2019-09-25 PROCEDURE — 93010 ELECTROCARDIOGRAM REPORT: CPT | Performed by: INTERNAL MEDICINE

## 2019-09-25 PROCEDURE — A9270 NON-COVERED ITEM OR SERVICE: HCPCS | Performed by: INTERNAL MEDICINE

## 2019-09-25 PROCEDURE — 700111 HCHG RX REV CODE 636 W/ 250 OVERRIDE (IP): Performed by: INTERNAL MEDICINE

## 2019-09-25 PROCEDURE — 90686 IIV4 VACC NO PRSV 0.5 ML IM: CPT | Performed by: INTERNAL MEDICINE

## 2019-09-25 PROCEDURE — 700102 HCHG RX REV CODE 250 W/ 637 OVERRIDE(OP): Performed by: INTERNAL MEDICINE

## 2019-09-25 PROCEDURE — 93005 ELECTROCARDIOGRAM TRACING: CPT | Performed by: INTERNAL MEDICINE

## 2019-09-25 RX ORDER — OMEPRAZOLE 20 MG/1
20 CAPSULE, DELAYED RELEASE ORAL
Qty: 30 CAP | Refills: 0 | Status: SHIPPED | OUTPATIENT
Start: 2019-09-26 | End: 2021-03-02

## 2019-09-25 RX ORDER — SUCRALFATE 1 G/1
1 TABLET ORAL
Qty: 56 TAB | Refills: 0 | Status: SHIPPED | OUTPATIENT
Start: 2019-09-25 | End: 2019-10-09

## 2019-09-25 RX ADMIN — SUCRALFATE 1 G: 1 TABLET ORAL at 05:59

## 2019-09-25 RX ADMIN — APIXABAN 5 MG: 5 TABLET, FILM COATED ORAL at 05:59

## 2019-09-25 RX ADMIN — INFLUENZA A VIRUS A/BRISBANE/02/2018 IVR-190 (H1N1) ANTIGEN (FORMALDEHYDE INACTIVATED), INFLUENZA A VIRUS A/KANSAS/14/2017 X-327 (H3N2) ANTIGEN (FORMALDEHYDE INACTIVATED), INFLUENZA B VIRUS B/PHUKET/3073/2013 ANTIGEN (FORMALDEHYDE INACTIVATED), AND INFLUENZA B VIRUS B/MARYLAND/15/2016 BX-69A ANTIGEN (FORMALDEHYDE INACTIVATED) 0.5 ML: 15; 15; 15; 15 INJECTION, SUSPENSION INTRAMUSCULAR at 09:32

## 2019-09-25 RX ADMIN — OMEPRAZOLE 20 MG: 20 CAPSULE, DELAYED RELEASE ORAL at 05:59

## 2019-09-25 NOTE — PROGRESS NOTES
Patient arrived on floor via bed. Patient placed on tele monitor. Monitor room notified. Patient SR on the monitor at this time. Patient declines any needs at this time. Patient has bilat groin sites with dressing CDI. Plan of care discussed with patient. Bed locked and in the lowest position with call light within reach.

## 2019-09-25 NOTE — PROGRESS NOTES
2 RN skin check completed with Denise ABRAHAM.     Bilat groin sites - dressing changed on right side - bilat sites now CDI  Bilat forearms have small scratches/scabs - per patient from soccer  Bilat feet dry/pink/blanching  Sacrum pink/blanching  Dressing on right radial site

## 2019-09-25 NOTE — DISCHARGE INSTRUCTIONS
ACTIVITY/SPECIFIC OUTPATIENT DISCHARGE INSTRUCTIONS  Post Ablation Patient Instructions:  No lifting > 10 lbs x 1 week.  No baths or hot tubs x 1 week.  May shower on Thursday 09/26/2019 and take off groin dressings.  Continue to monitor sites daily for warmth, redness, discolored drainage.     Please take the esophageal protection medication that is prescribed to you for one month post procedure without missed doses.  Please do not miss any doses of your blood thinner Eliquis.       Please walk and take deep breaths after discharge.  After discharge, if  experiences chest pain, shortness of breath, hemoptysis, neurological changes, high fever, lightheadedness/dizziness, trouble with catheter sites needs to be seen in the emergency department.     FOLLOW UP  Patient will follow up with EP in outpatient in 2-4 weeks as arranged for procedural followup or sooner if needed.  Patient instructed to call the office with any questions/concerns.     Discharge Instructions    Discharged to home by car with relative. Discharged via wheelchair, hospital escort: Yes.  Special equipment needed: Not Applicable    Be sure to schedule a follow-up appointment with your primary care doctor or any specialists as instructed.     Discharge Plan:   Diet Plan: Discussed  Activity Level: Discussed  Confirmed Follow up Appointment: Appointment Scheduled  Confirmed Symptoms Management: Discussed  Medication Reconciliation Updated: Yes  Influenza Vaccine Indication: Indicated: 9 to 64 years of age  Influenza Vaccine Given - only chart on this line when given: Influenza Vaccine Given (See MAR)    I understand that a diet low in cholesterol, fat, and sodium is recommended for good health. Unless I have been given specific instructions below for another diet, I accept this instruction as my diet prescription.   Other diet: Cardiac    Special Instructions: None    · Is patient discharged on Warfarin / Coumadin?   No     Cardiac Ablation  Cardiac  ablation is a procedure to disable a small amount of heart tissue in very specific places. The heart has many electrical connections. Sometimes these connections are abnormal and can cause the heart to beat very fast or irregularly. By disabling some of the problem areas, heart rhythm can be improved or made normal. Ablation is done for people who:   · Have Salvador-Parkinson-White syndrome.    · Have other fast heart rhythms (tachycardia).    · Have taken medicines for an abnormal heart rhythm (arrhythmia) that resulted in:    ¨ No success.    ¨ Side effects.    · May have a high-risk heartbeat that could result in death.    LET YOUR HEALTH CARE PROVIDER KNOW ABOUT:   · Any allergies you have or any previous reactions you have had to X-ray dye, food (such as seafood), medicine, or tape.    · All medicines you are taking, including vitamins, herbs, eye drops, creams, and over-the-counter medicines.    · Previous problems you or members of your family have had with the use of anesthetics.    · Any blood disorders you have.    · Previous surgeries or procedures (such as a kidney transplant) you have had.    · Medical conditions you have (such as kidney failure).    RISKS AND COMPLICATIONS  Generally, cardiac ablation is a safe procedure. However, problems can occur and include:   · Increased risk of cancer. Depending on how long it takes to do the ablation, the dose of radiation can be high.   · Bruising and bleeding where a thin, flexible tube (catheter) was inserted during the procedure.    · Bleeding into the chest, especially into the sac that surrounds the heart (serious).  · Need for a permanent pacemaker if the normal electrical system is damaged.    · The procedure may not be fully effective, and this may not be recognized for months. Repeat ablation procedures are sometimes required.  BEFORE THE PROCEDURE   · Follow any instructions from your health care provider regarding eating and drinking before the  "procedure.    · Take your medicines as directed at regular times with water, unless instructed otherwise by your health care provider. If you are taking diabetes medicine, including insulin, ask how you are to take it and if there are any special instructions you should follow. It is common to adjust insulin dosing the day of the ablation.    PROCEDURE  · An ablation is usually performed in a catheterization laboratory with the guidance of fluoroscopy. Fluoroscopy is a type of X-ray that helps your health care provider see images of your heart during the procedure.    · An ablation is a minimally invasive procedure. This means a small cut (incision) is made in either your neck or groin. Your health care provider will decide where to make the incision based on your medical history and physical exam.  · An IV tube will be started before the procedure begins. You will be given an anesthetic or medicine to help you relax (sedative).  · The skin on your neck or groin will be numbed. A needle will be inserted into a large vein in your neck or groin and catheters will be threaded to your heart.  · A special dye that shows up on fluoroscopy pictures may be injected through the catheter. The dye helps your health care provider see the area of the heart that needs treatment.  · The catheter has electrodes on the tip. When the area of heart tissue that is causing the arrhythmia is found, the catheter tip will send an electrical current to the area and \"scar\" the tissue.  Three types of energy can be used to ablate the heart tissue:    ¨ Heat (radiofrequency energy).    ¨ Laser energy.    ¨ Extreme cold (cryoablation).    · When the area of the heart has been ablated, the catheter will be taken out. Pressure will be held on the insertion site. This will help the insertion site clot and keep it from bleeding. A bandage will be placed on the insertion site.    AFTER THE PROCEDURE   · After the procedure, you will be taken to a " recovery area where your vital signs (blood pressure, heart rate, and breathing) will be monitored. The insertion site will also be monitored for bleeding.    · You will need to lie still for 4-6 hours. This is to ensure you do not bleed from the catheter insertion site.    This information is not intended to replace advice given to you by your health care provider. Make sure you discuss any questions you have with your health care provider.  Document Released: 05/06/2010 Document Revised: 01/08/2016 Document Reviewed: 05/12/2014  [employer] Interactive Patient Education © 2017 Elsevier Inc.    Omeprazole tablets (OTC)  Brand Names: [facility] Omeprazole 20mg Delayed-Release Tablet (Wildberry Mint), [facility] Omeprazole 20mg Delayed-Release Tablet (Wildberry), [facility] Omeprazole Delayed-Release 20mg Tablet, [facility] Omeprazole 20mg Delayed-Release Tablet, [facility] Omeprazole 20mg Delayed-Release Tablet (Wildberry Mint), [facility] Omeprazole 20mg Delayed-Release Tablet, [facility] Omeprazole Delayed-Release 20mg Tablet, [facility] Omeprazole Delayed-Release 20mg Tablet, Omeprazole 20mg Delayed-Release Tablet, Omeprazole 20mg Delayed-Release Tablet (Wildberry), [facility] Omeprazole 20mg Delayed-Release Tablet, Prilosec OTC 20.6mg Delayed-Release Tablet, Prilosec OTC 20.6mg Delayed-Release Tablet (Wildberry), Prilosec OTC 20mg Delayed-Release Tablet, Prilosec OTC 20mg Delayed-Release Tablet (Wildberry), [facility] Omeprazole 20mg Delayed-Release Tablet, [facility] Omeprazole 20mg Delayed-Release Tablet, [facility] Omeprazole 20mg Delayed-Release Tablet, [facility] Omeprazole 20mg Delayed-Release Tablet, [facility] Omeprazole 20mg Delayed-Release Tablet (Wildberry)      What is this medicine?  OMEPRAZOLE (oh ME pray zol) prevents the production of acid in the stomach. It is used to treat the symptoms of heartburn. You can buy this medicine without a prescription. This product is not for long-term use, unless otherwise directed by your doctor or health care  professional.  How should I use this medicine?  Take this medicine by mouth. Follow the directions on the product label. If you are taking this medicine without a prescription, take one tablet every day. Do not use for longer than 14 days or repeat a course of treatment more often than every 4 months unless directed by a doctor or healthcare professional. Take your dose at regular intervals every 24 hours. Swallow the tablet whole with a drink of water. Do not crush, break or chew. This medicine works best if taken on an empty stomach 30 minutes before breakfast. If you are using this medicine with the prescription of your doctor or healthcare professional, follow the directions you were given. Do not take your medicine more often than directed.  Talk to your pediatrician regarding the use of this medicine in children. Special care may be needed.  What side effects may I notice from receiving this medicine?  Side effects that you should report to your doctor or health care professional as soon as possible:  · allergic reactions like skin rash, itching or hives, swelling of the face, lips, or tongue  · bone, muscle or joint pain  · breathing problems  · chest pain or chest tightness  · dark yellow or brown urine  · diarrhea  · dizziness  · fast, irregular heartbeat  · feeling faint or lightheaded  · fever or sore throat  · muscle spasm  · palpitations  · rash on cheeks or arms that gets worse in the sun  · redness, blistering, peeling or loosening of the skin, including inside the mouth  · seizures  · stomach polyps  · tremors  · unusual bleeding or bruising  · unusually weak or tired  · yellowing of the eyes or skin  Side effects that usually do not require medical attention (report to your doctor or health care professional if they continue or are bothersome):  · constipation  · dry mouth  · headache  · loose stools  · nausea  What may interact with this medicine?  Do not take this medicine with any of the  following medications:  · atazanavir  · clopidogrel  · nelfinavir  This medicine may also interact with the following medications:  · ampicillin  · certain medicines for anxiety or sleep  · certain medicines that treat or prevent blood clots like warfarin  · cyclosporine  · diazepam  · digoxin  · disulfiram  · iron salts  · methotrexate  · mycophenolate mofetil  · phenytoin  · prescription medicine for fungal or yeast infection like itraconazole, ketoconazole, voriconazole  · saquinavir  · tacrolimus  What if I miss a dose?  If you miss a dose, take it as soon as you can. If it is almost time for your next dose, take only that dose. Do not take double or extra doses.  Where should I keep my medicine?  Keep out of the reach of children.  Store at room temperature between 20 and 25 degrees C (68 and 77 degrees F). Protect from light and moisture. Throw away any unused medicine after the expiration date.  What should I tell my health care provider before I take this medicine?  They need to know if you have any of these conditions:  · black or bloody stools  · chest pain  · difficulty swallowing  · have had heartburn for over 3 months  · have heartburn with dizziness, lightheadedness or sweating  · liver disease  · lupus  · stomach pain  · unexplained weight loss  · vomiting with blood  · wheezing  · an unusual or allergic reaction to omeprazole, other medicines, foods, dyes, or preservatives  · pregnant or trying to get pregnant  · breast-feeding  What should I watch for while using this medicine?  It can take several days before your heartburn gets better. Check with your doctor or health care professional if your condition does not start to get better, or if it gets worse.  Do not treat diarrhea with over the counter products. Contact your doctor if you have diarrhea that lasts more than 2 days or if it is severe and watery.  Do not treat yourself for heartburn with this medicine for more than 14 days in a row. You  should only use this medicine for a 2-week treatment period once every 4 months. If your symptoms return shortly after your therapy is complete, or within the 4 month time frame, call your doctor or health care professional.    Sucralfate tablets  What is this medicine?  SUCRALFATE (IVANIA edison fate) helps to treat ulcers of the intestine.  This medicine may be used for other purposes; ask your health care provider or pharmacist if you have questions.  COMMON BRAND NAME(S): Carafate  What should I tell my health care provider before I take this medicine?  They need to know if you have any of these conditions:  -kidney disease  -an unusual or allergic reaction to sucralfate, other medicines, foods, dyes, or preservatives  -pregnant or trying to get pregnant  -breast-feeding  How should I use this medicine?  Take this medicine by mouth with a glass of water. Follow the directions on the prescription label. This medicine works best if you take it on an empty stomach, 1 hour before meals. Take your doses at regular intervals. Do not take your medicine more often than directed. Do not stop taking except on your doctor's advice.  Talk to your pediatrician regarding the use of this medicine in children. Special care may be needed.  Overdosage: If you think you have taken too much of this medicine contact a poison control center or emergency room at once.  NOTE: This medicine is only for you. Do not share this medicine with others.  What if I miss a dose?  If you miss a dose, take it as soon as you can. If it is almost time for your next dose, take only that dose. Do not take double or extra doses.  What may interact with this medicine?  -antacid  -cimetidine  -digoxin  -ketoconazole  -phenytoin  -quinidine  -ranitidine  -some antibiotics like ciprofloxacin, norfloxacin, and ofloxacin  -theophylline  -thyroid hormones  -warfarin  This list may not describe all possible interactions. Give your health care provider a list of all  the medicines, herbs, non-prescription drugs, or dietary supplements you use. Also tell them if you smoke, drink alcohol, or use illegal drugs. Some items may interact with your medicine.  What should I watch for while using this medicine?  Visit your doctor or health care professional for regular check ups. Let your doctor know if your symptoms do not improve or if you feel worse.  Antacids should not be taken within one half hour before or after this medicine.  What side effects may I notice from receiving this medicine?  Side effects that you should report to your doctor or health care professional as soon as possible:  -allergic reactions like skin rash, itching or hives, swelling of the face, lips, or tongue  -difficulty breathing  Side effects that usually do not require medical attention (report to your doctor or health care professional if they continue or are bothersome):  -back pain  -constipation  -drowsy, dizzy  -dry mouth  -headache  -stomach upset, gas  -trouble sleeping  This list may not describe all possible side effects. Call your doctor for medical advice about side effects. You may report side effects to FDA at 0-359-FDA-5627.  Where should I keep my medicine?  Keep out of the reach of children.  Store at room temperature between 15 and 30 degrees C (59 and 86 degrees F). Keep container tightly closed. Throw away any unused medicine after the expiration date.  NOTE: This sheet is a summary. It may not cover all possible information. If you have questions about this medicine, talk to your doctor, pharmacist, or health care provider.  © 2018 Elsevier/Gold Standard (2009-08-19 15:46:20)      Depression / Suicide Risk    As you are discharged from this [facility] facility, it is important to learn how to keep safe from harming yourself.    Recognize the warning signs:  · Abrupt changes in personality, positive or negative- including increase in energy   · Giving away possessions  · Change in  eating patterns- significant weight changes-  positive or negative  · Change in sleeping patterns- unable to sleep or sleeping all the time   · Unwillingness or inability to communicate  · Depression  · Unusual sadness, discouragement and loneliness  · Talk of wanting to die  · Neglect of personal appearance   · Rebelliousness- reckless behavior  · Withdrawal from people/activities they love  · Confusion- inability to concentrate     If you or a loved one observes any of these behaviors or has concerns about self-harm, here's what you can do:  · Talk about it- your feelings and reasons for harming yourself  · Remove any means that you might use to hurt yourself (examples: pills, rope, extension cords, firearm)  · Get professional help from the community (Mental Health, Substance Abuse, psychological counseling)  · Do not be alone:Call your Safe Contact- someone whom you trust who will be there for you.  · Call your local CRISIS HOTLINE 216-5757 or 782-283-6840  · Call your local Children's Mobile Crisis Response Team [place] (680) 344-2035 or www.[employer]  · Call the toll free National Suicide Prevention Hotlines   · National Suicide Prevention Lifeline 655-054-CRPC (2599)  · National Hope Line Network 800-SUICIDE (356-3884)

## 2019-09-25 NOTE — PROGRESS NOTES
Received report from night shift RN Josefina. Assumed care of patient. Patient is SR on the monitor at this time. Patient is sitting up in bed with no family present at bedside at this time. Patient declines any needs at this time. Plan of care discussed with patient. Bed locked and in the lowest position with call light within reach.

## 2019-09-25 NOTE — PROGRESS NOTES
Patient discharged. Patient removed from tele monitor, monitor room notified, monitor returned to the front. Patient and spouse verbalize understanding of all discharge instructions. Patient wheeled down to [facility] to meet ride with all belongings

## 2019-09-25 NOTE — CARE PLAN
Problem: Communication  Goal: The ability to communicate needs accurately and effectively will improve  Outcome: PROGRESSING AS EXPECTED  Intervention: [place] patient and significant other/support system to call light to alert staff of needs  Flowsheets (Taken 9/24/2019 5118)  Oriented to:: All of the Following : Location of Bathroom, Visiting Policy, Unit Routine, Call Light and Bedside Controls, Bedside Rail Policy, Smoking Policy, Rights and Responsibilities, Bedside Report, and Patient Education Notebook  Intervention: Educate patient and significant other/support system about the plan of care, procedures, treatments, medications and allow for questions  Flowsheets (Taken 9/24/2019 4547)  Pt & Family Have Been Educated on Methods Available to Report Concerns Related to Care, Treatment, Services, and Patient Safety Issues: Yes

## 2019-09-25 NOTE — DISCHARGE SUMMARY
HOSPITAL DISCHARGE INSTRUCTIONS    CHIEF COMPLAINT ON ADMISSION  Elective RF ablation for Paroxysmal Atrial Fibrillation refractory to IC agents.     CODE STATUS  Full Code    HPI & HOSPITAL COURSE  This is a 29 y.o. year old male here for elective RF ablation for paroxysmal atrial fibrillation refractory to IC agents.     Medical history significant for Paroxysmal Atrial Fibrillation. No other significant medical history. Echo (06/13/19) shows normal LV function, EF 65% without significant valvular abnormalities. TSH normal. [id number] score: 0.     Procedural conclusions per Dr. Bruno's Op Note (09/24/2019):  Electrophysiology Procedure Note Procedure(s) Performed:   1) Atrial fibrillation ablation and EP study  2) Ablation of additional mechanism of tachycardia (RA flutter)  3) 3D mapping  4) ICE during diagnostics and intervention  5) BOBBY  6) Arrhythmia induction with IV drug infusion  Indication(s):  Paroxysmal atrial fibrillation  Physician(s): Irineo Bruno M.D.  Resident/Assistant(s): None  Anesthesia: General endotracheal anesthetic.  Complications:  None  Total ablation time: 1683 seconds  Fluoroscopy time: 5.0 minutes  Electrophysiologic Findings:    1. Sinus 703 ms,  ms, HV 42 ms. AVBCL = 310 ms.  Impressions:    1. Paroxysmal atrial fibrillation and inducible RA flutter    2. Successful RF ablation pulmonary vein isolation procedure.    3. Successful RF ablation of RA flutter    The patient has been seen and examined in EP rounds this AM.  His monitored rhythm is sinus presently.  Telemetry history, EKGs, labs, vital signs, and imaging have been reviewed and are stable, no arrhthymias or abnormalities.  His EKG today shows sinus rhythm, no acute abnormalities.  The patient denies any chest pain, dyspnea, dizziness, paraesthesias, or other complaints.  His physical exam is without acute findings and CMS fully intact; specifically his right and left femoral access sites which are clean, dry,  intact with tegaderm/gauze. No evidence of active bleeding, edema, erythema, or hematoma.  He has been out of bed and ambulated without difficulty.     Therefore, he is discharged in good and stable condition with close outpatient follow-up. Flecainide was discontinued post procedure.  Discussed with Dr. Bruno, will continue Diltiazem 120 mg daily. Will continue OAC with Eliquis.     DISCHARGE PROBLEM LIST  1. Paroxysmal Atrial Fibrillation/inducible RA flutter  2. S/P successful RF ablation with pulmonary vein isolation and RA flutter by Dr. Bruno on 09/24/2019.  3. Chronic Anticoagulation with Eliquis  4. High Risk Medication with Diltiazem     Darwin Alcaraz   Home Medication Instructions ALCON:[id number]    Printed on:09/25/19 5703   Medication Information                      apixaban (ELIQUIS) 5mg Tab  Take 1 Tab by mouth 2 Times a Day.             cetirizine (ZYRTEC) 10 MG Tab  Take 10 mg by mouth every day.             DILTIAZem CD (CARDIZEM CD) 120 MG CAPSULE SR 24 HR  Take 1 Cap by mouth every day.             omeprazole (PRILOSEC) 20 MG delayed-release capsule  Take 1 Cap by mouth every morning before breakfast.             sucralfate (CARAFATE) 1 GM Tab  Take 1 Tab by mouth 4 Times a Day,Before Meals and at Bedtime.               DIET: CARDIAC DIET    LABORATORY  Lab Results   Component Value Date/Time    SODIUM 142 09/23/2019 02:54 PM    POTASSIUM 4.4 09/23/2019 02:54 PM    CHLORIDE 107 09/23/2019 02:54 PM    CO2 26 09/23/2019 02:54 PM    GLUCOSE 101 (H) 09/23/2019 02:54 PM    BUN 20 09/23/2019 02:54 PM    CREATININE 1.50 (H) 09/23/2019 02:54 PM      Lab Results   Component Value Date/Time    WBC 5.3 09/23/2019 02:54 PM    HEMOGLOBIN 16.7 09/23/2019 02:54 PM    HEMATOCRIT 47.5 09/23/2019 02:54 PM    PLATELETCT 178 09/23/2019 02:54 PM      ACTIVITY/SPECIFIC OUTPATIENT DISCHARGE INSTRUCTIONS  Post Ablation Patient Instructions:  No lifting > 10 lbs x 1 week.  No baths or hot tubs x 1 week.  May shower on  Thursday 09/26/2019 and take off groin dressings.  Continue to monitor sites daily for warmth, redness, discolored drainage.     Please take the esophageal protection medication that is prescribed to you for one month post procedure without missed doses.  Please do not miss any doses of your blood thinner Eliquis.       Please walk and take deep breaths after discharge.  After discharge, if  experiences chest pain, shortness of breath, hemoptysis, neurological changes, high fever, lightheadedness/dizziness, trouble with catheter sites needs to be seen in the emergency department.    FOLLOW UP  Patient will follow up with EP in outpatient in 3-4 weeks as arranged for procedural followup or sooner if needed.  Patient instructed to call the office with any questions/concerns.  The above plan and medications were reviewed in detail with the patient at time of discharge.  All patients questions/concerns were answered and patient verbalized understanding and is in agreement.     Future Appointments   Date Time Provider Department Center   10/9/2019  2:40 PM AVINASH Villeda. [facility] None      Thank you for allowing me to participate in this patients care.  Please contact me with any questions or concerns.     JACEK Villeda   [facility]  [phone]

## 2019-09-25 NOTE — PROGRESS NOTES
Assumed care at 1900, bedside report received from day shift RN. Pt is Sr on the telemetry monitor. Patient is AO x 4 and is resting in bed with family at bedside. Initial assessment completed and orders reviewed. POC addressed with patient. Call light within reach and hourly rounding in place. No further questions at this time. Fall precautions in place.

## 2019-10-07 ENCOUNTER — TELEPHONE (OUTPATIENT)
Dept: CARDIOLOGY | Facility: MEDICAL CENTER | Age: 29
End: 2019-10-07

## 2019-10-07 NOTE — TELEPHONE ENCOUNTER
Gera Granados. I think it is normal post procedure. You are only 2 weeks out so the heart is still in a healing period. Rachelle is out today and will be back in the office tomorrow. I will ask her when she returns if she has any other concerns.As you increase your activity, just do as you tolerate. When your palpitations increase, slow the activity down.       ===View-only below this line===      ----- Message -----     From: Darwin Alcaraz     Sent: 10/4/2019  5:22 PM PDT       To: JACEK Villeda  Subject: Non-Urgent Medical Question    Lakeshia Bush,   I tried calling the office but I called a little late and it was closed.  I was kicking a soccer ball around with my daughter at her practice and just doing some light jogging when I started having some atrial bigeminy.  I also have had occasional PACs.  Not sure if it's all just normal as I was told to expect other arrhythmias for a time after the ablation but I wanted to check in.  I can email a picture of it if needed.  Sorry if I'm overreporting I wasn't clear on exactly what I needed to call for, if it was for anything out of the ordinary or just for afib.     Thanks for your time

## 2019-10-08 NOTE — TELEPHONE ENCOUNTER
Agreed.  Continue to monitor.  Scheduled from f/u tomorrow, will reassess at office visit.    Thanks,     AVINASH Villeda.   [facility]  [phone]

## 2019-10-09 ENCOUNTER — OFFICE VISIT (OUTPATIENT)
Dept: CARDIOLOGY | Facility: MEDICAL CENTER | Age: 29
End: 2019-10-09
Payer: COMMERCIAL

## 2019-10-09 VITALS
BODY MASS INDEX: 32.08 KG/M2 | WEIGHT: 258 LBS | DIASTOLIC BLOOD PRESSURE: 78 MMHG | HEIGHT: 75 IN | OXYGEN SATURATION: 97 % | HEART RATE: 59 BPM | SYSTOLIC BLOOD PRESSURE: 130 MMHG

## 2019-10-09 DIAGNOSIS — I48.0 PAF (PAROXYSMAL ATRIAL FIBRILLATION) (HCC): Primary | ICD-10-CM

## 2019-10-09 DIAGNOSIS — Z79.01 CHRONIC ANTICOAGULATION: ICD-10-CM

## 2019-10-09 DIAGNOSIS — Z86.79 S/P ABLATION OF ATRIAL FLUTTER: ICD-10-CM

## 2019-10-09 DIAGNOSIS — Z98.890 S/P ABLATION OF ATRIAL FLUTTER: ICD-10-CM

## 2019-10-09 DIAGNOSIS — Z98.890 S/P ABLATION OF ATRIAL FIBRILLATION: ICD-10-CM

## 2019-10-09 DIAGNOSIS — Z79.899 HIGH RISK MEDICATION USE: ICD-10-CM

## 2019-10-09 DIAGNOSIS — Z86.79 S/P ABLATION OF ATRIAL FIBRILLATION: ICD-10-CM

## 2019-10-09 PROCEDURE — 99214 OFFICE O/P EST MOD 30 MIN: CPT | Performed by: NURSE PRACTITIONER

## 2019-10-09 PROCEDURE — 93000 ELECTROCARDIOGRAM COMPLETE: CPT | Performed by: INTERNAL MEDICINE

## 2019-10-09 ASSESSMENT — ENCOUNTER SYMPTOMS
LOSS OF CONSCIOUSNESS: 0
CHILLS: 0
ORTHOPNEA: 0
CLAUDICATION: 0
PND: 0
BLOOD IN STOOL: 0
WHEEZING: 0
DIZZINESS: 0
PALPITATIONS: 1
ABDOMINAL PAIN: 0
SHORTNESS OF BREATH: 0
COUGH: 0
FEVER: 0
DIAPHORESIS: 0

## 2019-10-09 NOTE — PROGRESS NOTES
Cardiology/Electrophysiology Follow-up Note    Subjective:   Chief Complaint:   Chief Complaint   Patient presents with   • Atrial Fibrillation     FV     Darwin Alcaraz is a 29 y.o. male who presents today for follow up for Paroxysmal Atrial Fibrillation s/p RF ablation of AF/AFL by Dr. Bruno on 09/24/19.    He is followed by Dr. Banks and Dr. Bruno.   His zio monitor showed 28% AF burden with Hrs  bpm and therefore decided to to proceed with RF ablation with pulmonary vein isolation and RA flutter by Dr. Bruno on 09/24/19. His Flecainide was discontinued post procedure and he was continued on OAC and Diltiazem.    Medical history significant for Paroxysmal Atrial Fibrillation, s/p AF/AFL ablation on 09/24/19, on anticoagulation with Eliquis. No other significant medical history. Previously tested for sleep apnea, AHI 4.     Today in follow up with his daughter, he states he is feeling well.  He reports occasional intermittent palpitations and atrial bigemeny (on apple watch) with physical activity and restarting night shift, which has improved. Reviewed apple rhythm strips, shows sinus rhythm with PACs.  He denies chest pain, dizziness, pre syncope or syncope, dyspnea, PND, orthopnea, or lower extremity edema.  Denies any signs or symptoms of bleeding or bleeding issues. Patient endorses medication compliance.     Past Medical History:   Diagnosis Date   • Breath shortness     r/t afib   • Paroxysmal atrial fibrillation (HCC)      Past Surgical History:   Procedure Laterality Date   • OTHER  2007    multiple facial fracture repair on right side with metal on right face     Family History   Problem Relation Age of Onset   • No Known Problems Mother    • No Known Problems Father    • No Known Problems Sister    • No Known Problems Brother    • Hypertension Maternal Grandmother    • Cancer Neg Hx    • Diabetes Neg Hx    • Heart Disease Neg Hx      Social History     Socioeconomic History   • Marital  status:      Spouse name: Not on file   • Number of children: Not on file   • Years of education: Not on file   • Highest education level: Not on file   Occupational History   • Not on file   Social Needs   • Financial resource strain: Not on file   • Food insecurity:     Worry: Not on file     Inability: Not on file   • Transportation needs:     Medical: Not on file     Non-medical: Not on file   Tobacco Use   • Smoking status: Never Smoker   • Smokeless tobacco: Never Used   Substance and Sexual Activity   • Alcohol use: Not Currently     Comment: 2 per month   • Drug use: Not Currently     Comment: occ   • Sexual activity: Yes     Partners: Female   Lifestyle   • Physical activity:     Days per week: Not on file     Minutes per session: Not on file   • Stress: Not on file   Relationships   • Social connections:     Talks on phone: Not on file     Gets together: Not on file     Attends Christian service: Not on file     Active member of club or organization: Not on file     Attends meetings of clubs or organizations: Not on file     Relationship status: Not on file   • Intimate partner violence:     Fear of current or ex partner: Not on file     Emotionally abused: Not on file     Physically abused: Not on file     Forced sexual activity: Not on file   Other Topics Concern   • Not on file   Social History Narrative   • Not on file     Allergies   Allergen Reactions   • Pcn [Penicillins] Hives     Amoxicillin- HIVES       Current Outpatient Medications   Medication Sig Dispense Refill   • omeprazole (PRILOSEC) 20 MG delayed-release capsule Take 1 Cap by mouth every morning before breakfast. 30 Cap 0   • apixaban (ELIQUIS) 5mg Tab Take 1 Tab by mouth 2 Times a Day. 60 Tab 11   • cetirizine (ZYRTEC) 10 MG Tab Take 10 mg by mouth every day.     • DILTIAZem CD (CARDIZEM CD) 120 MG CAPSULE SR 24 HR Take 1 Cap by mouth every day. 90 Cap 3     No current facility-administered medications for this visit.      Review  "of Systems   Constitutional: Negative for chills, diaphoresis and fever.   Respiratory: Negative for cough, shortness of breath and wheezing.    Cardiovascular: Positive for palpitations. Negative for chest pain, orthopnea, claudication, leg swelling and PND.   Gastrointestinal: Negative for abdominal pain and blood in stool.   Genitourinary: Negative for hematuria.   Neurological: Negative for dizziness and loss of consciousness.     All others systems reviewed and negative.   Objective:     /78 (BP Location: Left arm, Patient Position: Sitting, BP Cuff Size: Adult)   Pulse (!) 59   Ht 1.905 m (6' 3\")   Wt 117 kg (258 lb)   SpO2 97%  Body mass index is 32.25 kg/m².    Physical Exam   Constitutional: He is oriented to person, place, and time. No distress.   HENT:   Head: Normocephalic.   Eyes: Pupils are equal, round, and reactive to light.   Neck: Normal range of motion. No JVD present.   Cardiovascular: Normal rate and regular rhythm.   No murmur heard.  Pulses:       Radial pulses are 2+ on the right side, and 2+ on the left side.        Dorsalis pedis pulses are 2+ on the right side, and 2+ on the left side.   Pulmonary/Chest: Effort normal and breath sounds normal. No respiratory distress. He has no wheezes. He has no rales. He exhibits no tenderness.   Abdominal: Soft. Bowel sounds are normal.   Musculoskeletal: He exhibits no edema.   Neurological: He is alert and oriented to person, place, and time.   Skin: Skin is warm and dry. He is not diaphoretic. No erythema.   Psychiatric: Mood, memory, affect and judgment normal.   Nursing note and vitals reviewed.    Cardiac Imaging and Procedures Review:    EKG 10/09/2019: sinus rhythm    Echocardiogram (06/13/2019):   FINDINGS  Left Ventricle  Normal left ventricular size, wall thickness, and systolic function.   Left ventricular ejection fraction is visually estimated to be 65%.   Normal regional wall motion. Normal diastolic function.  Right " Ventricle  The right ventricle was normal in size and function.  Right Atrium  The right atrium is normal in size.  Normal inferior vena cava size   without inspiratory collapse.  Left Atrium  The left atrium is normal in size.  Left atrial volume index is 22   mL/sq m.  Mitral Valve  Structurally normal mitral valve without significant stenosis or   regurgitation.  Aortic Valve  Tricuspid aortic valve. No stenosis or regurgitation seen.  Tricuspid Valve  Structurally normal tricuspid valve without significant stenosis. Trace   tricuspid regurgitation. Estimated right ventricular systolic pressure    is 30 mmHg. Right atrial pressure is estimated to be 8 mmHg.  Pulmonic Valve  Structurally normal pulmonic valve without significant stenosis or   regurgitation.  Pericardium  Normal pericardium without effusion.  Aorta  The aortic root is normal.  Ascending aorta diameter is 2.9 cm.    Procedural conclusions per Dr. Bruno's Op Note (09/24/2019):  Electrophysiology Procedure Note Procedure(s) Performed:   1) Atrial fibrillation ablation and EP study  2) Ablation of additional mechanism of tachycardia (RA flutter)  3) 3D mapping  4) ICE during diagnostics and intervention  5) BOBBY  6) Arrhythmia induction with IV drug infusion  Indication(s):  Paroxysmal atrial fibrillation  Physician(s): Irineo Bruno M.D.  Resident/Assistant(s): None  Anesthesia: General endotracheal anesthetic.  Complications:  None  Total ablation time: 1683 seconds  Fluoroscopy time: 5.0 minutes  Electrophysiologic Findings:    1. Sinus 703 ms,  ms, HV 42 ms. AVBCL = 310 ms.  Impressions:    1. Paroxysmal atrial fibrillation and inducible RA flutter    2. Successful RF ablation pulmonary vein isolation procedure.    3. Successful RF ablation of RA flutter    Labs (personally reviewed and notable for):   Lab Results   Component Value Date/Time    SODIUM 139 09/25/2019 08:07 AM    POTASSIUM 3.7 09/25/2019 08:07 AM    CHLORIDE 107 09/25/2019 08:07 AM     CO2 23 09/25/2019 08:07 AM    GLUCOSE 100 (H) 09/25/2019 08:07 AM    BUN 16 09/25/2019 08:07 AM    CREATININE 1.11 09/25/2019 08:07 AM      Lab Results   Component Value Date/Time    WBC 10.0 09/25/2019 08:12 AM    RBC 4.66 (L) 09/25/2019 08:12 AM    HEMOGLOBIN 14.5 09/25/2019 08:12 AM    HEMATOCRIT 40.8 (L) 09/25/2019 08:12 AM    MCV 87.6 09/25/2019 08:12 AM    MCH 31.1 09/25/2019 08:12 AM    MCHC 35.5 (H) 09/25/2019 08:12 AM    MPV 10.2 09/25/2019 08:12 AM    NEUTSPOLYS 40.90 (L) 09/23/2019 02:54 PM    LYMPHOCYTES 49.60 (H) 09/23/2019 02:54 PM    MONOCYTES 4.90 09/23/2019 02:54 PM    EOSINOPHILS 3.80 09/23/2019 02:54 PM    BASOPHILS 0.60 09/23/2019 02:54 PM      PT/INR:   Lab Results   Component Value Date/Time    PROTHROMBTM 14.6 09/23/2019 02:54 PM    INR 1.11 09/23/2019 02:54 PM   ]  Assessment:     1. PAF (paroxysmal atrial fibrillation) ([facility])  EKG   2. S/P ablation of atrial fibrillation     3. S/P ablation of atrial flutter     4. High risk medication use      Diltiazem   5. Chronic anticoagulation      Eliquis     Medical Decision Making:  Today's Assessment / Status / Plan:   1. Paroxysmal Atrial Fibrillation/inducible RA flutter  - S/P successful RF ablation with PVI and RA flutter by Dr. Bruno on 09/24/19.  - Echo (06/13/19) shows normal LV function, EF 65% without significant valvular abnormalities. TSH normal. [id number] score: 0.   - Relatively asymptomatic, reports some intermittent palpitations, BP stable.  - EKG today shows sinus rhythm, no acute findings.   - Completed Carafate, continue PPI x 1 month post ablation.  - On OAC with Eliquis, continue.  - On Diltiazem 120 mg daily, continue.   - Will continue OAC and dilt. And continue monitor. Will reassess at f/u appointment, if no AF/AFL, consideration for discontinuation.   - Encouraged light-moderate physical activity as tolerated, and to continue to monitor BP and Hrs, and call office if abnormal.     2. Chronic Anticoagulation: Eliquis  -  No signs/symptoms of bleeding.   - [id number] score: 0  - Continue OAC with Eliquis 5 mg BID.    3. High Risk Medication: Diltiazem    Plan reviewed in detail with the patient and he verbalizes understanding and is in agreement. RTC 6-8 weeks with Dr. Bruno, sooner if clinical condition changes.     Thank you for allowing me to participate in this patients care.  Please contact me with any questions or concerns.     AVINASH Villeda.   [facility]  [phone]    Collaborating MD/ADD: Dr. Rosey MD.

## 2019-10-09 NOTE — LETTER
[facility]   [address]  [place] [address]  Phone: [phone]  Fax: [phone]              Darwin Alcaraz  1990    Encounter Date: 10/9/2019    JACEK Villeda          PROGRESS NOTE:  Cardiology/Electrophysiology Follow-up Note    Subjective:   Chief Complaint:   Chief Complaint   Patient presents with   • Atrial Fibrillation     FV     Darwin Alcaraz is a 29 y.o. male who presents today for follow up for Paroxysmal Atrial Fibrillation s/p RF ablation of AF/AFL by Dr. Bruno on 09/24/19.    He is followed by Dr. Banks and Dr. Bruno.   His zio monitor showed 28% AF burden with Hrs  bpm and therefore decided to to proceed with RF ablation with pulmonary vein isolation and RA flutter by Dr. Bruno on 09/24/19. His Flecainide was discontinued post procedure and he was continued on OAC and Diltiazem.    Medical history significant for Paroxysmal Atrial Fibrillation, s/p AF/AFL ablation on 09/24/19, on anticoagulation with Eliquis. No other significant medical history. Previously tested for sleep apnea, AHI 4.     Today in follow up with his daughter, he states he is feeling well.  He reports occasional intermittent palpitations and atrial bigemeny (on apple watch) with physical activity and restarting night shift, which has improved. Reviewed apple rhythm strips, shows sinus rhythm with PACs.  He denies chest pain, dizziness, pre syncope or syncope, dyspnea, PND, orthopnea, or lower extremity edema.  Denies any signs or symptoms of bleeding or bleeding issues. Patient endorses medication compliance.     Past Medical History:   Diagnosis Date   • Breath shortness     r/t afib   • Paroxysmal atrial fibrillation (HCC)      Past Surgical History:   Procedure Laterality Date   • OTHER  2007    multiple facial fracture repair on right side with metal on right face     Family History   Problem Relation Age of Onset   • No Known  Problems Mother    • No Known Problems Father    • No Known Problems Sister    • No Known Problems Brother    • Hypertension Maternal Grandmother    • Cancer Neg Hx    • Diabetes Neg Hx    • Heart Disease Neg Hx      Social History     Socioeconomic History   • Marital status:      Spouse name: Not on file   • Number of children: Not on file   • Years of education: Not on file   • Highest education level: Not on file   Occupational History   • Not on file   Social Needs   • Financial resource strain: Not on file   • Food insecurity:     Worry: Not on file     Inability: Not on file   • Transportation needs:     Medical: Not on file     Non-medical: Not on file   Tobacco Use   • Smoking status: Never Smoker   • Smokeless tobacco: Never Used   Substance and Sexual Activity   • Alcohol use: Not Currently     Comment: 2 per month   • Drug use: Not Currently     Comment: occ   • Sexual activity: Yes     Partners: Female   Lifestyle   • Physical activity:     Days per week: Not on file     Minutes per session: Not on file   • Stress: Not on file   Relationships   • Social connections:     Talks on phone: Not on file     Gets together: Not on file     Attends Buddhist service: Not on file     Active member of club or organization: Not on file     Attends meetings of clubs or organizations: Not on file     Relationship status: Not on file   • Intimate partner violence:     Fear of current or ex partner: Not on file     Emotionally abused: Not on file     Physically abused: Not on file     Forced sexual activity: Not on file   Other Topics Concern   • Not on file   Social History Narrative   • Not on file     Allergies   Allergen Reactions   • Pcn [Penicillins] Hives     Amoxicillin- HIVES       Current Outpatient Medications   Medication Sig Dispense Refill   • omeprazole (PRILOSEC) 20 MG delayed-release capsule Take 1 Cap by mouth every morning before breakfast. 30 Cap 0   • apixaban (ELIQUIS) 5mg Tab Take 1 Tab by  "mouth 2 Times a Day. 60 Tab 11   • cetirizine (ZYRTEC) 10 MG Tab Take 10 mg by mouth every day.     • DILTIAZem CD (CARDIZEM CD) 120 MG CAPSULE SR 24 HR Take 1 Cap by mouth every day. 90 Cap 3     No current facility-administered medications for this visit.      Review of Systems   Constitutional: Negative for chills, diaphoresis and fever.   Respiratory: Negative for cough, shortness of breath and wheezing.    Cardiovascular: Positive for palpitations. Negative for chest pain, orthopnea, claudication, leg swelling and PND.   Gastrointestinal: Negative for abdominal pain and blood in stool.   Genitourinary: Negative for hematuria.   Neurological: Negative for dizziness and loss of consciousness.     All others systems reviewed and negative.   Objective:     /78 (BP Location: Left arm, Patient Position: Sitting, BP Cuff Size: Adult)   Pulse (!) 59   Ht 1.905 m (6' 3\")   Wt 117 kg (258 lb)   SpO2 97%  Body mass index is 32.25 kg/m².    Physical Exam   Constitutional: He is oriented to person, place, and time. No distress.   HENT:   Head: Normocephalic.   Eyes: Pupils are equal, round, and reactive to light.   Neck: Normal range of motion. No JVD present.   Cardiovascular: Normal rate and regular rhythm.   No murmur heard.  Pulses:       Radial pulses are 2+ on the right side, and 2+ on the left side.        Dorsalis pedis pulses are 2+ on the right side, and 2+ on the left side.   Pulmonary/Chest: Effort normal and breath sounds normal. No respiratory distress. He has no wheezes. He has no rales. He exhibits no tenderness.   Abdominal: Soft. Bowel sounds are normal.   Musculoskeletal: He exhibits no edema.   Neurological: He is alert and oriented to person, place, and time.   Skin: Skin is warm and dry. He is not diaphoretic. No erythema.   Psychiatric: Mood, memory, affect and judgment normal.   Nursing note and vitals reviewed.    Cardiac Imaging and Procedures Review:    EKG 10/09/2019: sinus " rhythm    Echocardiogram (06/13/2019):   FINDINGS  Left Ventricle  Normal left ventricular size, wall thickness, and systolic function.   Left ventricular ejection fraction is visually estimated to be 65%.   Normal regional wall motion. Normal diastolic function.  Right Ventricle  The right ventricle was normal in size and function.  Right Atrium  The right atrium is normal in size.  Normal inferior vena cava size   without inspiratory collapse.  Left Atrium  The left atrium is normal in size.  Left atrial volume index is 22   mL/sq m.  Mitral Valve  Structurally normal mitral valve without significant stenosis or   regurgitation.  Aortic Valve  Tricuspid aortic valve. No stenosis or regurgitation seen.  Tricuspid Valve  Structurally normal tricuspid valve without significant stenosis. Trace   tricuspid regurgitation. Estimated right ventricular systolic pressure    is 30 mmHg. Right atrial pressure is estimated to be 8 mmHg.  Pulmonic Valve  Structurally normal pulmonic valve without significant stenosis or   regurgitation.  Pericardium  Normal pericardium without effusion.  Aorta  The aortic root is normal.  Ascending aorta diameter is 2.9 cm.    Procedural conclusions per Dr. Bruno's Op Note (09/24/2019):  Electrophysiology Procedure Note Procedure(s) Performed:   1) Atrial fibrillation ablation and EP study  2) Ablation of additional mechanism of tachycardia (RA flutter)  3) 3D mapping  4) ICE during diagnostics and intervention  5) BOBBY  6) Arrhythmia induction with IV drug infusion  Indication(s):  Paroxysmal atrial fibrillation  Physician(s): Irineo Bruno M.D.  Resident/Assistant(s): None  Anesthesia: General endotracheal anesthetic.  Complications:  None  Total ablation time: 1683 seconds  Fluoroscopy time: 5.0 minutes  Electrophysiologic Findings:    1. Sinus 703 ms,  ms, HV 42 ms. AVBCL = 310 ms.  Impressions:    1. Paroxysmal atrial fibrillation and inducible RA flutter    2. Successful RF ablation  pulmonary vein isolation procedure.    3. Successful RF ablation of RA flutter    Labs (personally reviewed and notable for):   Lab Results   Component Value Date/Time    SODIUM 139 09/25/2019 08:07 AM    POTASSIUM 3.7 09/25/2019 08:07 AM    CHLORIDE 107 09/25/2019 08:07 AM    CO2 23 09/25/2019 08:07 AM    GLUCOSE 100 (H) 09/25/2019 08:07 AM    BUN 16 09/25/2019 08:07 AM    CREATININE 1.11 09/25/2019 08:07 AM      Lab Results   Component Value Date/Time    WBC 10.0 09/25/2019 08:12 AM    RBC 4.66 (L) 09/25/2019 08:12 AM    HEMOGLOBIN 14.5 09/25/2019 08:12 AM    HEMATOCRIT 40.8 (L) 09/25/2019 08:12 AM    MCV 87.6 09/25/2019 08:12 AM    MCH 31.1 09/25/2019 08:12 AM    MCHC 35.5 (H) 09/25/2019 08:12 AM    MPV 10.2 09/25/2019 08:12 AM    NEUTSPOLYS 40.90 (L) 09/23/2019 02:54 PM    LYMPHOCYTES 49.60 (H) 09/23/2019 02:54 PM    MONOCYTES 4.90 09/23/2019 02:54 PM    EOSINOPHILS 3.80 09/23/2019 02:54 PM    BASOPHILS 0.60 09/23/2019 02:54 PM      PT/INR:   Lab Results   Component Value Date/Time    PROTHROMBTM 14.6 09/23/2019 02:54 PM    INR 1.11 09/23/2019 02:54 PM   ]  Assessment:     1. PAF (paroxysmal atrial fibrillation) ([facility])  EKG   2. S/P ablation of atrial fibrillation     3. S/P ablation of atrial flutter     4. High risk medication use      Diltiazem   5. Chronic anticoagulation      Eliquis     Medical Decision Making:  Today's Assessment / Status / Plan:   1. Paroxysmal Atrial Fibrillation/inducible RA flutter  - S/P successful RF ablation with  PVI and RA flutter by Dr. Bruno on 09/24/19.  - Echo (06/13/19) shows normal LV function, EF 65% without significant valvular abnormalities. TSH normal. [id number] score: 0.   - Relatively asymptomatic, reports some intermittent palpitations, BP stable.  - EKG today shows sinus rhythm, no acute findings.   - Completed Carafate, continue PPI x 1 month post ablation.  - On OAC with Eliquis, continue.  - On Diltiazem 120 mg daily, continue.   - Will continue OAC and dilt. And  continue monitor. Will reassess at f/u appointment, if no AF/AFL, consideration for discontinuation.   - Encouraged light-moderate physical activity as tolerated, and to continue to monitor BP and Hrs, and call office if abnormal.     2. Chronic Anticoagulation: Eliquis  - No signs/symptoms of bleeding.   - [id number] score: 0  - Continue OAC with Eliquis 5 mg BID.    3. High Risk Medication: Diltiazem    Plan reviewed in detail with the patient and he verbalizes understanding and is in agreement. RTC 6-8 weeks with Dr. Bruno, sooner if clinical condition changes.     Thank you for allowing me to participate in this patients care.  Please contact me with any questions or concerns.     AVINASH Villeda.   [facility]  [phone]    Collaborating MD/ADD: Dr. Rosey MD.      Urbano Banks M.D.  [address]  [address]  [place] [address]  VIA In Basket

## 2019-10-10 LAB — EKG IMPRESSION: NORMAL

## 2019-12-06 PROCEDURE — 81401 MOPATH PROCEDURE LEVEL 2: CPT

## 2019-12-12 ENCOUNTER — OFFICE VISIT (OUTPATIENT)
Dept: CARDIOLOGY | Facility: MEDICAL CENTER | Age: 29
End: 2019-12-12
Payer: COMMERCIAL

## 2019-12-12 VITALS
HEART RATE: 61 BPM | BODY MASS INDEX: 32.95 KG/M2 | HEIGHT: 75 IN | WEIGHT: 265 LBS | OXYGEN SATURATION: 95 % | SYSTOLIC BLOOD PRESSURE: 124 MMHG | DIASTOLIC BLOOD PRESSURE: 82 MMHG

## 2019-12-12 DIAGNOSIS — I48.0 PAF (PAROXYSMAL ATRIAL FIBRILLATION) (HCC): ICD-10-CM

## 2019-12-12 LAB
GEN STRUCT VAR COPY NUM: NORMAL
SMN1 GENE MUT ANL BLD/T: NORMAL
SMN1+SMN2 GENE MUT ANL BLD/T: NORMAL
SMN2 GENE MUT ANL BLD/T: NORMAL
SPECIMEN SOURCE: NORMAL
SYMPTOM: NO

## 2019-12-12 PROCEDURE — 99213 OFFICE O/P EST LOW 20 MIN: CPT | Performed by: INTERNAL MEDICINE

## 2019-12-12 PROCEDURE — 93000 ELECTROCARDIOGRAM COMPLETE: CPT | Performed by: INTERNAL MEDICINE

## 2019-12-12 NOTE — PROGRESS NOTES
Arrhythmia Clinic Note (Established patient)    DOS: 12/12/2019    Chief complaint/Reason for consult: PAF    Interval History:  Patient is a 28 yo M. 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 History of very symptomatic PAF. S/p PVI with us. Post ablation has done well. Felt a few PVCs but no more AF like he had before. Still taking Dilt and Eliquis. Here for follow-up. No complaints today.    ROS (+ highlighted in red):  Constitutional: Fevers/chills/fatigue/weightloss  HEENT: Blurry vision/eye pain/sore throat/hearing loss  Respiratory: Shortness of breath/cough  Cardiovascular: Chest pain/palpitations/edema/orthopnea/syncope  GI: Nausea/vomitting/diarrhea  MSK: Arthralgias/myagias/muscle weakness  Skin: Rash/sores  Neurological: Numbness/tremors/vertigo  Endocrine: Excessive thirst/polyuria/cold intolerance/heat intolerance  Psych: Depression/anxiety    Past Medical History:   Diagnosis Date   • Breath shortness     r/t afib   • Paroxysmal atrial fibrillation (HCC)        Past Surgical History:   Procedure Laterality Date   • OTHER  2007    multiple facial fracture repair on right side with metal on right face       Social History     Socioeconomic History   • Marital status:      Spouse name: Not on file   • Number of children: Not on file   • Years of education: Not on file   • Highest education level: Not on file   Occupational History   • Not on file   Social Needs   • Financial resource strain: Not on file   • Food insecurity:     Worry: Not on file     Inability: Not on file   • Transportation needs:     Medical: Not on file     Non-medical: Not on file   Tobacco Use   • Smoking status: Never Smoker   • Smokeless tobacco: Never Used   Substance and Sexual Activity   • Alcohol use: Not Currently     Comment: 2 per month   • Drug use: Not Currently     Comment: occ   • Sexual activity: Yes     Partners: Female   Lifestyle   • Physical activity:     Days per week: Not on file     Minutes per session: Not on file  "  • Stress: Not on file   Relationships   • Social connections:     Talks on phone: Not on file     Gets together: Not on file     Attends Restoration service: Not on file     Active member of club or organization: Not on file     Attends meetings of clubs or organizations: Not on file     Relationship status: Not on file   • Intimate partner violence:     Fear of current or ex partner: Not on file     Emotionally abused: Not on file     Physically abused: Not on file     Forced sexual activity: Not on file   Other Topics Concern   • Not on file   Social History Narrative   • Not on file       Family History   Problem Relation Age of Onset   • No Known Problems Mother    • No Known Problems Father    • No Known Problems Sister    • No Known Problems Brother    • Hypertension Maternal Grandmother    • Cancer Neg Hx    • Diabetes Neg Hx    • Heart Disease Neg Hx        Allergies   Allergen Reactions   • Pcn [Penicillins] Hives     Amoxicillin- HIVES       Current Outpatient Medications   Medication Sig Dispense Refill   • apixaban (ELIQUIS) 5mg Tab Take 1 Tab by mouth 2 Times a Day. 60 Tab 11   • DILTIAZem CD (CARDIZEM CD) 120 MG CAPSULE SR 24 HR Take 1 Cap by mouth every day. 90 Cap 3   • omeprazole (PRILOSEC) 20 MG delayed-release capsule Take 1 Cap by mouth every morning before breakfast. (Patient not taking: Reported on 12/12/2019) 30 Cap 0   • cetirizine (ZYRTEC) 10 MG Tab Take 10 mg by mouth every day.       No current facility-administered medications for this visit.        Physical Exam:  Vitals:    12/12/19 1042   BP: 124/82   BP Location: Left arm   Patient Position: Sitting   BP Cuff Size: Large adult   Pulse: 61   SpO2: 95%   Weight: 120.2 kg (265 lb)   Height: 1.905 m (6' 3\")     General appearance: NAD, conversant   Eyes: anicteric sclerae, moist conjunctivae; no lid-lag; PERRLA  HENT: Atraumatic; oropharynx clear with moist mucous membranes and no mucosal ulcerations; normal hard and soft palate  Neck: " Trachea midline; FROM, supple, no thyromegaly or lymphadenopathy  Lungs: CTA, with normal respiratory effort and no intercostal retractions  CV: RRR, no MRGs, no JVD  Abdomen: Soft, non-tender; no masses or HSM  Extremities: No peripheral edema or extremity lymphadenopathy  Skin: Normal temperature, turgor and texture; no rash, ulcers or subcutaneous nodules  Psych: Appropriate affect, alert and oriented to person, place and time    Data:  Labs reviewed    Prior echo/stress reviewed:  Normal LV function    EKG interpreted by me:  NSR    Impression/Plan:  1. PAF (paroxysmal atrial fibrillation) (HCC)  EKG     -Been 3 months post ablation doing well  -Stopping the Eliquis and the Dilt  -I will see him for f/u in 6 mo    Irineo Bruno MD

## 2019-12-14 LAB — EKG IMPRESSION: NORMAL

## 2020-06-19 ENCOUNTER — OFFICE VISIT (OUTPATIENT)
Dept: CARDIOLOGY | Facility: MEDICAL CENTER | Age: 30
End: 2020-06-19
Payer: COMMERCIAL

## 2020-06-19 VITALS
OXYGEN SATURATION: 95 % | HEIGHT: 75 IN | WEIGHT: 262 LBS | DIASTOLIC BLOOD PRESSURE: 78 MMHG | HEART RATE: 68 BPM | BODY MASS INDEX: 32.58 KG/M2 | SYSTOLIC BLOOD PRESSURE: 110 MMHG

## 2020-06-19 DIAGNOSIS — Z98.890 S/P ABLATION OF ATRIAL FLUTTER: ICD-10-CM

## 2020-06-19 DIAGNOSIS — I48.0 PAF (PAROXYSMAL ATRIAL FIBRILLATION) (HCC): ICD-10-CM

## 2020-06-19 DIAGNOSIS — Z86.79 S/P ABLATION OF ATRIAL FLUTTER: ICD-10-CM

## 2020-06-19 PROCEDURE — 99213 OFFICE O/P EST LOW 20 MIN: CPT | Performed by: INTERNAL MEDICINE

## 2020-06-19 PROCEDURE — 93000 ELECTROCARDIOGRAM COMPLETE: CPT | Performed by: INTERNAL MEDICINE

## 2020-06-19 ASSESSMENT — FIBROSIS 4 INDEX: FIB4 SCORE: 0.91

## 2020-06-19 NOTE — PROGRESS NOTES
"Arrhythmia Clinic Note (Established Patient)    Date of service: 6/19/2020    Reason for visit/Chief complaint: F/u AF    HPI:   Patient is a 31 yo M. History of symptomatic AF/AFL s/p prior ablation. Done well post ablation. No further arrhythmia. Now off AAD/OAC. No complaints today. Recently had a baby daughter and busy with that. Otherwise built himself a [employer] and a mountain bike and started working out. No issues with exercise.    ROS: Negative for orthopnea, PND, palpitations, chest pain    Physical Exam:  Vitals:    06/19/20 1031   BP: 110/78   BP Location: Left arm   Patient Position: Sitting   BP Cuff Size: Adult   Pulse: 68   SpO2: 95%   Weight: 118.8 kg (262 lb)   Height: 1.905 m (6' 3\")     Gen: NAD, conversant  HEENT: PERRL, EOMI  LUNGS: CTA B, no w/r/r  CV: RRR, no m/r/g, no JVD  Abd: Soft, NT/ND, +BS  Ext: no edema, warm and well perfused    Labs reviewed    EKG interpreted by me:  NSR    Impression/Recs:  1. PAF (paroxysmal atrial fibrillation) ([facility])  EKG   2. S/P ablation of atrial flutter       -Doing well off meds  -No changes today  -F/u in 12 mo    Irineo Bruno MD    "

## 2020-07-01 LAB — EKG IMPRESSION: NORMAL

## 2020-07-06 ENCOUNTER — NURSE TRIAGE (OUTPATIENT)
Dept: HEALTH INFORMATION MANAGEMENT | Facility: OTHER | Age: 30
End: 2020-07-06

## 2020-07-06 NOTE — TELEPHONE ENCOUNTER
1. Caller Name: self                 Call Back Number: cell  [facility] PCP or Specialty Provider: Yes in epic        2.  In the last two weeks, has the patient had any new or worsening symptoms (not explained by alternative diagnosis)? Yes, the patient reports the following COVID-19 consistent symptoms: sore throat.    3.  Does patient have any comoribidities? None     4.  Has the patient traveled in the last 14 days OR had any known contact with someone who is suspected or confirmed to have COVID-19?  Yes, patients brother is positive.    5. POTENTIAL PUI (LOW): Offered virtual visit to limit potential exposure to others; patient also given self care instructions     patient has slight sore throat and has positive exposure, please contact to order COVID test    Note routed to [facility] Provider: Provider action needed: Patient's brother has tested positive, please reach out to patient to order a COVID 19 test or to advise further

## 2020-07-07 DIAGNOSIS — U07.1 COVID-19: ICD-10-CM

## 2020-07-08 ENCOUNTER — HOSPITAL ENCOUNTER (OUTPATIENT)
Dept: LAB | Facility: MEDICAL CENTER | Age: 30
End: 2020-07-08
Attending: PHYSICIAN ASSISTANT
Payer: COMMERCIAL

## 2020-07-08 DIAGNOSIS — U07.1 COVID-19: ICD-10-CM

## 2020-07-08 PROCEDURE — C9803 HOPD COVID-19 SPEC COLLECT: HCPCS

## 2020-07-08 PROCEDURE — U0003 INFECTIOUS AGENT DETECTION BY NUCLEIC ACID (DNA OR RNA); SEVERE ACUTE RESPIRATORY SYNDROME CORONAVIRUS 2 (SARS-COV-2) (CORONAVIRUS DISEASE [COVID-19]), AMPLIFIED PROBE TECHNIQUE, MAKING USE OF HIGH THROUGHPUT TECHNOLOGIES AS DESCRIBED BY CMS-2020-01-R: HCPCS

## 2020-07-08 PROCEDURE — U0004 COV-19 TEST NON-CDC HGH THRU: HCPCS | Mod: 91

## 2020-07-09 LAB
COVID ORDER STATUS COVID19: NORMAL
SARS-COV-2 RNA RESP QL NAA+PROBE: DETECTED
SPECIMEN SOURCE: ABNORMAL

## 2020-07-10 ENCOUNTER — TELEPHONE (OUTPATIENT)
Dept: MEDICAL GROUP | Facility: MEDICAL CENTER | Age: 30
End: 2020-07-10

## 2020-07-10 NOTE — TELEPHONE ENCOUNTER
VOICEMAIL  1. Caller Name: [facility]                      Call Back Number: [phone]    2. Message: Lab LVM stating patient had a positive lab for Covid.    3. Patient approves office to leave a detailed voicemail/MyChart message: yes

## 2020-12-20 DIAGNOSIS — Z23 NEED FOR VACCINATION: ICD-10-CM

## 2020-12-29 ENCOUNTER — IMMUNIZATION (OUTPATIENT)
Dept: FAMILY PLANNING/WOMEN'S HEALTH CLINIC | Facility: IMMUNIZATION CENTER | Age: 30
End: 2020-12-29
Attending: FAMILY MEDICINE
Payer: COMMERCIAL

## 2020-12-29 DIAGNOSIS — Z23 NEED FOR VACCINATION: ICD-10-CM

## 2020-12-29 DIAGNOSIS — Z23 ENCOUNTER FOR VACCINATION: Primary | ICD-10-CM

## 2020-12-29 PROCEDURE — 91301 MODERNA SARS-COV-2 VACCINE: CPT

## 2020-12-29 PROCEDURE — 0011A MODERNA SARS-COV-2 VACCINE: CPT

## 2021-01-30 PROCEDURE — 91301 MODERNA SARS-COV-2 VACCINE: CPT

## 2021-01-30 PROCEDURE — 0012A MODERNA SARS-COV-2 VACCINE: CPT

## 2021-01-31 ENCOUNTER — IMMUNIZATION (OUTPATIENT)
Dept: FAMILY PLANNING/WOMEN'S HEALTH CLINIC | Facility: IMMUNIZATION CENTER | Age: 31
End: 2021-01-31
Payer: COMMERCIAL

## 2021-01-31 DIAGNOSIS — Z23 ENCOUNTER FOR VACCINATION: Primary | ICD-10-CM

## 2021-03-02 ENCOUNTER — OFFICE VISIT (OUTPATIENT)
Dept: MEDICAL GROUP | Facility: MEDICAL CENTER | Age: 31
End: 2021-03-02
Payer: COMMERCIAL

## 2021-03-02 VITALS
HEART RATE: 68 BPM | OXYGEN SATURATION: 98 % | WEIGHT: 234.6 LBS | HEIGHT: 75 IN | TEMPERATURE: 97.7 F | BODY MASS INDEX: 29.17 KG/M2 | DIASTOLIC BLOOD PRESSURE: 80 MMHG | RESPIRATION RATE: 16 BRPM | SYSTOLIC BLOOD PRESSURE: 124 MMHG

## 2021-03-02 DIAGNOSIS — Z23 NEED FOR VACCINATION: ICD-10-CM

## 2021-03-02 DIAGNOSIS — Z00.00 PREVENTATIVE HEALTH CARE: ICD-10-CM

## 2021-03-02 DIAGNOSIS — K42.9 PERIUMBILICAL HERNIA: ICD-10-CM

## 2021-03-02 PROCEDURE — 99213 OFFICE O/P EST LOW 20 MIN: CPT | Performed by: PHYSICIAN ASSISTANT

## 2021-03-02 ASSESSMENT — PATIENT HEALTH QUESTIONNAIRE - PHQ9: CLINICAL INTERPRETATION OF PHQ2 SCORE: 0

## 2021-03-02 ASSESSMENT — FIBROSIS 4 INDEX: FIB4 SCORE: 0.94

## 2021-03-02 NOTE — PROGRESS NOTES
"Chief Complaint   Patient presents with   • Referral Needed     hernia,Dr.Ganser        HPI  Darwin Alcaraz is a 31 y.o. male here today for abdominal hernia.     Patient has noticed hernia above been applied and has been going on for couple years.  Recently he has been having more pain and discomfort especially with activities and worsening gout.  Given running causes pain.  States sometimes he gets the blood and he has to push it back.  No nausea vomiting fever.            Exam:  /80 (BP Location: Right arm, Patient Position: Sitting)   Pulse 68   Temp 36.5 °C (97.7 °F) (Temporal)   Resp 16   Ht 1.905 m (6' 3\")   Wt 106 kg (234 lb 9.6 oz)   SpO2 98%   Constitutional: Alert, no distress, plus 3 vital signs  Abdomen: pos hernia, bellybutton, reducible    A/P:      1. Need for vaccination      2. Periumbilical hernia    - US-HERNIA ABDOMEN; Future  - REFERRAL TO GENERAL SURGERY    3. Preventative health care  - CBC WITH DIFFERENTIAL; Future  - Comp Metabolic Panel; Future  - Lipid Profile; Future  - TSH WITH REFLEX TO FT4; Future  - VITAMIN D,25 HYDROXY; Future          F/U: prn   "

## 2021-05-13 ENCOUNTER — APPOINTMENT (OUTPATIENT)
Dept: RADIOLOGY | Facility: MEDICAL CENTER | Age: 31
End: 2021-05-13
Attending: PHYSICIAN ASSISTANT
Payer: COMMERCIAL

## 2022-01-06 DIAGNOSIS — Z23 NEED FOR INFLUENZA VACCINATION: ICD-10-CM

## 2023-07-08 DIAGNOSIS — R11.0 NAUSEA: ICD-10-CM

## 2023-07-08 RX ORDER — ONDANSETRON 4 MG/1
4 TABLET, FILM COATED ORAL EVERY 4 HOURS PRN
Qty: 30 TABLET | Refills: 5 | Status: SHIPPED | OUTPATIENT
Start: 2023-07-08

## 2024-01-03 DIAGNOSIS — J20.9 ACUTE BRONCHITIS, UNSPECIFIED ORGANISM: ICD-10-CM

## 2024-01-03 RX ORDER — AZITHROMYCIN 250 MG/1
TABLET, FILM COATED ORAL
Qty: 6 TABLET | Refills: 0 | Status: SHIPPED | OUTPATIENT
Start: 2024-01-03